# Patient Record
Sex: MALE | Race: WHITE | NOT HISPANIC OR LATINO
[De-identification: names, ages, dates, MRNs, and addresses within clinical notes are randomized per-mention and may not be internally consistent; named-entity substitution may affect disease eponyms.]

---
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
## 2020-07-30 ENCOUNTER — INPATIENT (INPATIENT)
Facility: HOSPITAL | Age: 20
LOS: 2 days | Discharge: HOME | DRG: 872 | End: 2020-08-02
Attending: STUDENT IN AN ORGANIZED HEALTH CARE EDUCATION/TRAINING PROGRAM | Admitting: HOSPITALIST
Payer: COMMERCIAL

## 2020-07-30 VITALS
TEMPERATURE: 103 F | OXYGEN SATURATION: 97 % | DIASTOLIC BLOOD PRESSURE: 55 MMHG | SYSTOLIC BLOOD PRESSURE: 110 MMHG | RESPIRATION RATE: 22 BRPM | HEART RATE: 110 BPM

## 2020-07-30 DIAGNOSIS — K52.9 NONINFECTIVE GASTROENTERITIS AND COLITIS, UNSPECIFIED: ICD-10-CM

## 2020-07-30 DIAGNOSIS — A41.9 SEPSIS, UNSPECIFIED ORGANISM: ICD-10-CM

## 2020-07-30 DIAGNOSIS — Z02.9 ENCOUNTER FOR ADMINISTRATIVE EXAMINATIONS, UNSPECIFIED: ICD-10-CM

## 2020-07-30 LAB
ALBUMIN SERPL ELPH-MCNC: 4.8 G/DL — SIGNIFICANT CHANGE UP (ref 3.3–5)
ALP SERPL-CCNC: 75 U/L — SIGNIFICANT CHANGE UP (ref 40–120)
ALT FLD-CCNC: 16 U/L — SIGNIFICANT CHANGE UP (ref 10–45)
ANION GAP SERPL CALC-SCNC: 17 MMOL/L — SIGNIFICANT CHANGE UP (ref 5–17)
APTT BLD: 29.2 SEC — SIGNIFICANT CHANGE UP (ref 27.5–35.5)
AST SERPL-CCNC: 22 U/L — SIGNIFICANT CHANGE UP (ref 10–40)
BASOPHILS # BLD AUTO: 0 K/UL — SIGNIFICANT CHANGE UP (ref 0–0.2)
BASOPHILS NFR BLD AUTO: 0 % — SIGNIFICANT CHANGE UP (ref 0–2)
BILIRUB SERPL-MCNC: 1.4 MG/DL — HIGH (ref 0.2–1.2)
BUN SERPL-MCNC: 15 MG/DL — SIGNIFICANT CHANGE UP (ref 7–23)
CALCIUM SERPL-MCNC: 9.4 MG/DL — SIGNIFICANT CHANGE UP (ref 8.4–10.5)
CHLORIDE SERPL-SCNC: 96 MMOL/L — SIGNIFICANT CHANGE UP (ref 96–108)
CO2 SERPL-SCNC: 23 MMOL/L — SIGNIFICANT CHANGE UP (ref 22–31)
CREAT SERPL-MCNC: 1.09 MG/DL — SIGNIFICANT CHANGE UP (ref 0.5–1.3)
EOSINOPHIL # BLD AUTO: 0 K/UL — SIGNIFICANT CHANGE UP (ref 0–0.5)
EOSINOPHIL NFR BLD AUTO: 0 % — SIGNIFICANT CHANGE UP (ref 0–6)
GLUCOSE SERPL-MCNC: 125 MG/DL — HIGH (ref 70–99)
HCT VFR BLD CALC: 42.7 % — SIGNIFICANT CHANGE UP (ref 39–50)
HGB BLD-MCNC: 14.4 G/DL — SIGNIFICANT CHANGE UP (ref 13–17)
HIV 1 & 2 AB SERPL IA.RAPID: SIGNIFICANT CHANGE UP
INR BLD: 1.22 RATIO — HIGH (ref 0.88–1.16)
LIDOCAIN IGE QN: 28 U/L — SIGNIFICANT CHANGE UP (ref 7–60)
LYMPHOCYTES # BLD AUTO: 0.32 K/UL — LOW (ref 1–3.3)
LYMPHOCYTES # BLD AUTO: 2.6 % — LOW (ref 13–44)
MANUAL SMEAR VERIFICATION: SIGNIFICANT CHANGE UP
MCHC RBC-ENTMCNC: 29.8 PG — SIGNIFICANT CHANGE UP (ref 27–34)
MCHC RBC-ENTMCNC: 33.7 GM/DL — SIGNIFICANT CHANGE UP (ref 32–36)
MCV RBC AUTO: 88.4 FL — SIGNIFICANT CHANGE UP (ref 80–100)
MONOCYTES # BLD AUTO: 0.64 K/UL — SIGNIFICANT CHANGE UP (ref 0–0.9)
MONOCYTES NFR BLD AUTO: 5.2 % — SIGNIFICANT CHANGE UP (ref 2–14)
NEUTROPHILS # BLD AUTO: 11.08 K/UL — HIGH (ref 1.8–7.4)
NEUTROPHILS NFR BLD AUTO: 80.9 % — HIGH (ref 43–77)
NEUTS BAND # BLD: 9.6 % — HIGH (ref 0–8)
PLAT MORPH BLD: NORMAL — SIGNIFICANT CHANGE UP
PLATELET # BLD AUTO: 204 K/UL — SIGNIFICANT CHANGE UP (ref 150–400)
POTASSIUM SERPL-MCNC: 3.6 MMOL/L — SIGNIFICANT CHANGE UP (ref 3.5–5.3)
POTASSIUM SERPL-SCNC: 3.6 MMOL/L — SIGNIFICANT CHANGE UP (ref 3.5–5.3)
PROT SERPL-MCNC: 7.4 G/DL — SIGNIFICANT CHANGE UP (ref 6–8.3)
PROTHROM AB SERPL-ACNC: 14.4 SEC — HIGH (ref 10.6–13.6)
RBC # BLD: 4.83 M/UL — SIGNIFICANT CHANGE UP (ref 4.2–5.8)
RBC # FLD: 12 % — SIGNIFICANT CHANGE UP (ref 10.3–14.5)
RBC BLD AUTO: SIGNIFICANT CHANGE UP
SARS-COV-2 RNA SPEC QL NAA+PROBE: SIGNIFICANT CHANGE UP
SODIUM SERPL-SCNC: 136 MMOL/L — SIGNIFICANT CHANGE UP (ref 135–145)
VARIANT LYMPHS # BLD: 1.7 % — SIGNIFICANT CHANGE UP (ref 0–6)
WBC # BLD: 12.24 K/UL — HIGH (ref 3.8–10.5)
WBC # FLD AUTO: 12.24 K/UL — HIGH (ref 3.8–10.5)

## 2020-07-30 PROCEDURE — 99285 EMERGENCY DEPT VISIT HI MDM: CPT | Mod: CR

## 2020-07-30 PROCEDURE — 71045 X-RAY EXAM CHEST 1 VIEW: CPT | Mod: 26

## 2020-07-30 PROCEDURE — 74177 CT ABD & PELVIS W/CONTRAST: CPT | Mod: 26

## 2020-07-30 PROCEDURE — 99223 1ST HOSP IP/OBS HIGH 75: CPT

## 2020-07-30 RX ORDER — SODIUM CHLORIDE 9 MG/ML
1000 INJECTION, SOLUTION INTRAVENOUS
Refills: 0 | Status: DISCONTINUED | OUTPATIENT
Start: 2020-07-30 | End: 2020-07-31

## 2020-07-30 RX ORDER — KETOROLAC TROMETHAMINE 30 MG/ML
15 SYRINGE (ML) INJECTION ONCE
Refills: 0 | Status: DISCONTINUED | OUTPATIENT
Start: 2020-07-30 | End: 2020-07-30

## 2020-07-30 RX ORDER — ACETAMINOPHEN 500 MG
1000 TABLET ORAL ONCE
Refills: 0 | Status: COMPLETED | OUTPATIENT
Start: 2020-07-30 | End: 2020-07-30

## 2020-07-30 RX ORDER — METRONIDAZOLE 500 MG
500 TABLET ORAL ONCE
Refills: 0 | Status: COMPLETED | OUTPATIENT
Start: 2020-07-30 | End: 2020-07-30

## 2020-07-30 RX ORDER — CIPROFLOXACIN LACTATE 400MG/40ML
400 VIAL (ML) INTRAVENOUS ONCE
Refills: 0 | Status: COMPLETED | OUTPATIENT
Start: 2020-07-30 | End: 2020-07-30

## 2020-07-30 RX ORDER — METRONIDAZOLE 500 MG
500 TABLET ORAL EVERY 8 HOURS
Refills: 0 | Status: DISCONTINUED | OUTPATIENT
Start: 2020-07-30 | End: 2020-08-02

## 2020-07-30 RX ORDER — SODIUM CHLORIDE 9 MG/ML
1000 INJECTION INTRAMUSCULAR; INTRAVENOUS; SUBCUTANEOUS ONCE
Refills: 0 | Status: COMPLETED | OUTPATIENT
Start: 2020-07-30 | End: 2020-07-30

## 2020-07-30 RX ORDER — CIPROFLOXACIN LACTATE 400MG/40ML
400 VIAL (ML) INTRAVENOUS EVERY 12 HOURS
Refills: 0 | Status: DISCONTINUED | OUTPATIENT
Start: 2020-07-30 | End: 2020-08-02

## 2020-07-30 RX ADMIN — Medication 400 MILLIGRAM(S): at 23:25

## 2020-07-30 RX ADMIN — Medication 100 MILLIGRAM(S): at 20:12

## 2020-07-30 RX ADMIN — Medication 15 MILLIGRAM(S): at 16:59

## 2020-07-30 RX ADMIN — Medication 400 MILLIGRAM(S): at 20:12

## 2020-07-30 RX ADMIN — Medication 200 MILLIGRAM(S): at 18:47

## 2020-07-30 RX ADMIN — SODIUM CHLORIDE 1000 MILLILITER(S): 9 INJECTION INTRAMUSCULAR; INTRAVENOUS; SUBCUTANEOUS at 16:59

## 2020-07-30 RX ADMIN — Medication 15 MILLIGRAM(S): at 17:29

## 2020-07-30 NOTE — H&P ADULT - HISTORY OF PRESENT ILLNESS
19M c no signif PMHx, pw fever, chills, abd pain, diarrhea for 1 day.    Pt goes to the [employer], and [place], in [facility] from Feb '20 to Apr '20, and at home with his mother from Apr '20 to Jul '20. [place]. 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 He has never had any symptoms like this before. Pt states he woke at 4:30AM this morning when fever, chills, which progressed to abd pain and 2 episodes of loose nonbloody diarrhea. Pt also reports some associated nausea, lightheaded, and SOB with his chills. Pt denies cough, CP, sore throat, HA, vomiting.    VS: Tm 103.2, P 110, /55, R 25, 96% RA  In the ED, received cipro, flagyl, toradol, NS 1L

## 2020-07-30 NOTE — ED PROVIDER NOTE - ATTENDING CONTRIBUTION TO CARE
RGUJRAL 18yo male presents from [facility] presents with fever x 1 day. States he returned from [place] 2 days ago. Today woke up with fever tmax 103, diffuse abdominal cramps and diarrhea x2. Diarrhea is watery no blood. No n/v. Denies any cough, rhinorrhea. + SOB with fever.   Denies any known COVID exposure.  On exam, Patient is awake, alert and oriented x 3.  Patient is well appearing and in no acute distress.  NCAT, PERRL, EOMI. No conjunctivitis.  Neck is supple, No LAD.  Lungs are CTA B/L,+S1S2 no murmurs,  Abdomen:Soft nd/ + diffuse abdominal tenderness +bs no rebound or guarding.  Extremity no edema or calf tender.  Skin with no rash.  Neuro CN3-12 intact. Strength 5/5 in upper and lower extremities. Nml Sensation.Gait normal.   Check Labs, CT to eval for colitis. IVF and anti pyretics and re eval.

## 2020-07-30 NOTE — H&P ADULT - NSHPPHYSICALEXAM_GEN_ALL_CORE
PHYSICAL EXAM:   GENERAL: Alert. Not confused. No acute distress. +thin. Not cachectic. Not obese.  HEAD:  Atraumatic. Normocephalic.  EYES: EOMI. PERRLA. Normal conjunctiva/sclera.  ENT: Neck supple. No JVD. Moist oral mucosa. Not edentulous. No thrush.  LYMPH: Normal supraclavicular/cervical lymph nodes.   CARDIAC: +tachy, Not joselito. Regular rhythm. Not irregularly irregular. S1. S2. No murmur. No rub. No distant heart sounds.  LUNG/CHEST: CTAB. BS equal bilaterally. No wheezes. No rales. No rhonchi.  ABDOMEN: Soft. Mild periumbilical tenderness. No distension. No fluid wave. Normal bowel sounds.  BACK: No midline/vertebral tenderness. No flank tenderness.  VASCULAR: +2 b/l radial or ulnar pulses. Palpable DP pulses.  EXTREMITIES:  No clubbing. No cyanosis. No edema. Moving all 4.  NEUROLOGY: A&Ox3. Non-focal exam. Cranial nerves intact. Normal speech. Sensation intact.  PSYCH: Normal behavior. Normal affect.  SKIN: No jaundice. No erythema. No rash/lesion.  Vascular Access:     ICU Vital Signs Last 24 Hrs  T(C): 37.6 (30 Jul 2020 19:23), Max: 39.6 (30 Jul 2020 16:00)  T(F): 99.6 (30 Jul 2020 19:23), Max: 103.2 (30 Jul 2020 16:00)  HR: 97 (30 Jul 2020 19:23) (88 - 110)  BP: 122/90 (30 Jul 2020 19:23) (110/55 - 130/73)  BP(mean): --  ABP: --  ABP(mean): --  RR: 22 (30 Jul 2020 19:23) (14 - 25)  SpO2: 100% (30 Jul 2020 19:23) (96% - 100%)      I&O's Summary

## 2020-07-30 NOTE — H&P ADULT - PROBLEM SELECTOR PLAN 2
- 2/2 colitis  - low suspicion for covid 2/2 no cough, absence of lung opacities on CT a/p, although may be ?early covid  - IVF  - f/u blood cx

## 2020-07-30 NOTE — ED PROVIDER NOTE - OBJECTIVE STATEMENT
20 yo male from the [facility] sent for symptoms of covid. PT states "I came from [place]  on the 26th and was covid tested and was negative.  I have felt fevers, chills, general malaise for the past couple of days. 18 yo male from the [facility] sent for symptoms of covid. PT states "I came from [place]  on the 26th and was covid tested and was negative.  I have felt fevers, chills, general malaise for the past couple of days. I also have stomach pains and had two episodes of diarrhea.  I want to get tested again for COVID and possibly  be admitted.

## 2020-07-30 NOTE — ED PROVIDER NOTE - PROGRESS NOTE DETAILS
SPoke with DR. Latif from [facility].  Pt being admitted, blood cultures complete Spoke to hospitalist, patient w recent travel and is high risk for COVID. RGUJRAL

## 2020-07-30 NOTE — H&P ADULT - PROBLEM SELECTOR PLAN 1
- cont cipro flagyl  - pt should f/u with GI as outpt for resolution of mild lymphadenopathy  - low residue diet  - check stool culture, o&p

## 2020-07-30 NOTE — ED CLERICAL - NS ED CLERK NOTE PRE-ARRIVAL INFORMATION; ADDITIONAL PRE-ARRIVAL INFORMATION
CC/Reason For referral: Recently from [facility]. covid neg 7/26.  Now c/o abd pain, diarrhea, fever 102. Medicated with tylenol 1 gm at 1515  Preferred Consultant(if applicable): sawyer  Who admits for you (if needed): na  Do you have documents you would like to fax over? no  Would you still like to speak to an ED attending? please call with disposition

## 2020-07-30 NOTE — ED ADULT NURSE REASSESSMENT NOTE - NS ED NURSE REASSESS COMMENT FT1
Report received from EMANUEL Hernandez. Pt resting comfortably in bed at this time. VS as documented. Provided with blankets for comfort. Bed locked and lowered. Comfort and safety measures maintained.

## 2020-07-30 NOTE — ED ADULT NURSE NOTE - NSIMPLEMENTINTERV_GEN_ALL_ED
Implemented All Universal Safety Interventions:  [place] to call system. Call bell, personal items and telephone within reach. Instruct patient to call for assistance. Room bathroom lighting operational. Non-slip footwear when patient is off stretcher. Physically safe environment: no spills, clutter or unnecessary equipment. Stretcher in lowest position, wheels locked, appropriate side rails in place.

## 2020-07-30 NOTE — ED ADULT NURSE NOTE - OBJECTIVE STATEMENT
19 y M presents to the ED with dizziness, lightheadedness, headaches, diarrhea, abdominal pain, light sensitivity, fevers and chills. Pt reports that he was in [place] and has recently tested negative. Reports had a gram of Tylenol 2 hours ago. On assessment, A&Ox3. Denies numbness and tingling. Breathing spontaneously and unlabored. Sating 96% on Room air. Denies cough, SOB and CP. S1 and S2 heard. No Peripheral edema. Cap refill 2s. No JVD. Peripheral pulses strong and equal bilaterally. On cardiac monitor, Sinus tachycardia. Denies CP, SOB and palpitations. Abdomen soft, nondistended, negative CVA tenderness, positive bowel sounds in all four quadrants. Pt is continent. Denies dysuria, melena and hematuria. IV placed 18g in RAC. Labs drawn and sent. Pt safety maintained. Call bell within reach. Side rails in upward position. Pt awaiting dispo.

## 2020-07-30 NOTE — H&P ADULT - NSHPLABSRESULTS_GEN_ALL_CORE
Personally reviewed labs.   Personally reviewed imaging.                         14.4   12.24 )-----------( 204      ( 30 Jul 2020 17:21 )             42.7       07-30    136  |  96  |  15  ----------------------------<  125<H>  3.6   |  23  |  1.09    Ca    9.4      30 Jul 2020 17:21    TPro  7.4  /  Alb  4.8  /  TBili  1.4<H>  /  DBili  x   /  AST  22  /  ALT  16  /  AlkPhos  75  07-30            LIVER FUNCTIONS - ( 30 Jul 2020 17:21 )  Alb: 4.8 g/dL / Pro: 7.4 g/dL / ALK PHOS: 75 U/L / ALT: 16 U/L / AST: 22 U/L / GGT: x             PT/INR - ( 30 Jul 2020 17:21 )   PT: 14.4 sec;   INR: 1.22 ratio         PTT - ( 30 Jul 2020 17:21 )  PTT:29.2 sec

## 2020-07-30 NOTE — H&P ADULT - NSHPREVIEWOFSYSTEMS_GEN_ALL_CORE
REVIEW OF SYSTEMS:  CONSTITUTIONAL: No weakness. +fevers. +chills. +rigors. No weight loss. No night sweats. +poor appetite.  EYES: No blurry or double vision. No eye pain.  ENT: No hearing difficulty. No vertigo. No dysphagia. No sore throat. No Sinusitis/rhinorrhea.   NECK: No pain. No stiffness/rigidity.  CARDIAC: No chest pain. No palpitations. +lightheadedness. No syncope.  RESPIRATORY: No cough. +SOB. No hemoptysis.  GASTROINTESTINAL: +abdominal pain. +nausea. No vomiting. No hematemesis. +diarrhea. No constipation. No melena. No hematochezia.  GENITOURINARY: No dysuria. No frequency. No hesitancy. No hematuria. No oliguria.  NEUROLOGICAL: No numbness/tingling. No focal weakness. No urinary or fecal incontinence. No headache. No unsteady gait.  BACK: No back pain. No flank pain.  EXTREMITIES: No lower extremity edema. Full ROM. No joint pain.  SKIN: No rashes. No itching. No other lesions.  PSYCHIATRIC: No depression. No anxiety. No SI/HI.  ALLERGIC: No lip swelling. No hives.  All other review of systems is negative unless indicated above.  Unless indicated above, unable to assess ROS 2/2

## 2020-07-31 LAB
ALBUMIN SERPL ELPH-MCNC: 4.1 G/DL — SIGNIFICANT CHANGE UP (ref 3.3–5)
ALP SERPL-CCNC: 55 U/L — SIGNIFICANT CHANGE UP (ref 40–120)
ALT FLD-CCNC: 13 U/L — SIGNIFICANT CHANGE UP (ref 10–45)
ANION GAP SERPL CALC-SCNC: 18 MMOL/L — HIGH (ref 5–17)
AST SERPL-CCNC: 21 U/L — SIGNIFICANT CHANGE UP (ref 10–40)
BILIRUB DIRECT SERPL-MCNC: 0.3 MG/DL — HIGH (ref 0–0.2)
BILIRUB INDIRECT FLD-MCNC: 1.7 MG/DL — HIGH (ref 0.2–1)
BILIRUB SERPL-MCNC: 2 MG/DL — HIGH (ref 0.2–1.2)
BUN SERPL-MCNC: 14 MG/DL — SIGNIFICANT CHANGE UP (ref 7–23)
CALCIUM SERPL-MCNC: 8.4 MG/DL — SIGNIFICANT CHANGE UP (ref 8.4–10.5)
CHLORIDE SERPL-SCNC: 100 MMOL/L — SIGNIFICANT CHANGE UP (ref 96–108)
CO2 SERPL-SCNC: 17 MMOL/L — LOW (ref 22–31)
CREAT SERPL-MCNC: 1.14 MG/DL — SIGNIFICANT CHANGE UP (ref 0.5–1.3)
CRP SERPL-MCNC: 8.92 MG/DL — HIGH (ref 0–0.4)
GAS PNL BLDV: SIGNIFICANT CHANGE UP
GLUCOSE SERPL-MCNC: 129 MG/DL — HIGH (ref 70–99)
HCT VFR BLD CALC: 41.6 % — SIGNIFICANT CHANGE UP (ref 39–50)
HGB BLD-MCNC: 13.8 G/DL — SIGNIFICANT CHANGE UP (ref 13–17)
INR BLD: 1.36 RATIO — HIGH (ref 0.88–1.16)
MAGNESIUM SERPL-MCNC: 1.3 MG/DL — LOW (ref 1.6–2.6)
MCHC RBC-ENTMCNC: 29.9 PG — SIGNIFICANT CHANGE UP (ref 27–34)
MCHC RBC-ENTMCNC: 33.2 GM/DL — SIGNIFICANT CHANGE UP (ref 32–36)
MCV RBC AUTO: 90.2 FL — SIGNIFICANT CHANGE UP (ref 80–100)
NRBC # BLD: 0 /100 WBCS — SIGNIFICANT CHANGE UP (ref 0–0)
PHOSPHATE SERPL-MCNC: 2.2 MG/DL — LOW (ref 2.5–4.5)
PLATELET # BLD AUTO: 147 K/UL — LOW (ref 150–400)
POTASSIUM SERPL-MCNC: 3.7 MMOL/L — SIGNIFICANT CHANGE UP (ref 3.5–5.3)
POTASSIUM SERPL-SCNC: 3.7 MMOL/L — SIGNIFICANT CHANGE UP (ref 3.5–5.3)
PROT SERPL-MCNC: 6.3 G/DL — SIGNIFICANT CHANGE UP (ref 6–8.3)
PROTHROM AB SERPL-ACNC: 15.8 SEC — HIGH (ref 10.6–13.6)
RBC # BLD: 4.61 M/UL — SIGNIFICANT CHANGE UP (ref 4.2–5.8)
RBC # FLD: 12.2 % — SIGNIFICANT CHANGE UP (ref 10.3–14.5)
SARS-COV-2 IGG SERPL QL IA: NEGATIVE — SIGNIFICANT CHANGE UP
SARS-COV-2 IGM SERPL IA-ACNC: <0.1 INDEX — SIGNIFICANT CHANGE UP
SODIUM SERPL-SCNC: 135 MMOL/L — SIGNIFICANT CHANGE UP (ref 135–145)
WBC # BLD: 12.43 K/UL — HIGH (ref 3.8–10.5)
WBC # FLD AUTO: 12.43 K/UL — HIGH (ref 3.8–10.5)

## 2020-07-31 PROCEDURE — 99233 SBSQ HOSP IP/OBS HIGH 50: CPT | Mod: GC

## 2020-07-31 PROCEDURE — 99233 SBSQ HOSP IP/OBS HIGH 50: CPT

## 2020-07-31 PROCEDURE — 99232 SBSQ HOSP IP/OBS MODERATE 35: CPT

## 2020-07-31 RX ORDER — MAGNESIUM SULFATE 500 MG/ML
2 VIAL (ML) INJECTION ONCE
Refills: 0 | Status: COMPLETED | OUTPATIENT
Start: 2020-07-31 | End: 2020-07-31

## 2020-07-31 RX ORDER — SODIUM CHLORIDE 9 MG/ML
1000 INJECTION, SOLUTION INTRAVENOUS
Refills: 0 | Status: DISCONTINUED | OUTPATIENT
Start: 2020-07-31 | End: 2020-08-02

## 2020-07-31 RX ORDER — POTASSIUM PHOSPHATE, MONOBASIC POTASSIUM PHOSPHATE, DIBASIC 236; 224 MG/ML; MG/ML
15 INJECTION, SOLUTION INTRAVENOUS ONCE
Refills: 0 | Status: COMPLETED | OUTPATIENT
Start: 2020-07-31 | End: 2020-07-31

## 2020-07-31 RX ORDER — ACETAMINOPHEN 500 MG
1000 TABLET ORAL ONCE
Refills: 0 | Status: COMPLETED | OUTPATIENT
Start: 2020-07-31 | End: 2020-07-31

## 2020-07-31 RX ORDER — ACETAMINOPHEN 500 MG
650 TABLET ORAL ONCE
Refills: 0 | Status: COMPLETED | OUTPATIENT
Start: 2020-07-31 | End: 2020-07-31

## 2020-07-31 RX ADMIN — Medication 50 GRAM(S): at 10:28

## 2020-07-31 RX ADMIN — Medication 400 MILLIGRAM(S): at 05:56

## 2020-07-31 RX ADMIN — Medication 1000 MILLIGRAM(S): at 00:26

## 2020-07-31 RX ADMIN — Medication 100 MILLIGRAM(S): at 23:02

## 2020-07-31 RX ADMIN — Medication 100 MILLIGRAM(S): at 06:57

## 2020-07-31 RX ADMIN — Medication 650 MILLIGRAM(S): at 14:59

## 2020-07-31 RX ADMIN — Medication 1000 MILLIGRAM(S): at 06:57

## 2020-07-31 RX ADMIN — Medication 100 MILLIGRAM(S): at 01:14

## 2020-07-31 RX ADMIN — Medication 650 MILLIGRAM(S): at 15:23

## 2020-07-31 RX ADMIN — POTASSIUM PHOSPHATE, MONOBASIC POTASSIUM PHOSPHATE, DIBASIC 62.5 MILLIMOLE(S): 236; 224 INJECTION, SOLUTION INTRAVENOUS at 13:58

## 2020-07-31 RX ADMIN — Medication 200 MILLIGRAM(S): at 05:42

## 2020-07-31 RX ADMIN — Medication 100 MILLIGRAM(S): at 18:05

## 2020-07-31 RX ADMIN — Medication 200 MILLIGRAM(S): at 16:50

## 2020-07-31 NOTE — CONSULT NOTE ADULT - ASSESSMENT
19M without PMHx, presenting fever, chills, abd pain, diarrhea for 1 day Found to have sepsis with mild colitis of large bowel. HIV negative.     Overall, Sepsis, colitis, leukocytosis with bandemia, mild hyperbilirubinemia.     Sepsis with colitis - Likely acute gastroenteritis due to recent food. Presentation favors infectious agents of acute nonbloody diarrhea, likely viral.  Still would consider Covid infection due to high fevers, lymphopenia, and colitis. Less possible consideration would be inflammatory bowel disease. HIV negative.   - Send GI PCR  - cont flagyll 500mg q8h and cipro 400mg IV q12h  - f/u blood and stool cultures  - monitor stool counts  - send covid inflammatory markers - LDH, ferritin, ESR    Caleb Carrera MD  Fellow, Infectious Diseases, PGY-4  Pager: [phone]  After 5pm/Weekends: Call [phone] 19M without PMHx, presenting fever, chills, abd pain, diarrhea for 1 day Found to have sepsis with mild colitis of large bowel. HIV negative.     Overall, Sepsis, colitis, leukocytosis with bandemia, mild hyperbilirubinemia.     Sepsis with colitis - Likely acute gastroenteritis due to recent food. Presentation favors infectious agents of acute nonbloody diarrhea, likely viral.  Still would consider Covid infection due to high fevers, lymphopenia, and colitis. Less possible consideration would be inflammatory bowel disease. HIV negative.   - Send GI PCR  - cont flagyll 500mg q8h and cipro 400mg IV q12h  - f/u blood and stool cultures  - monitor stool counts  - send covid inflammatory markers - LDH, ferritin, ESR  - repeat covid PCR    Caleb Carrera MD  Fellow, Infectious Diseases, PGY-4  Pager: [phone]  After 5pm/Weekends: Call [phone] 19M without PMHx, presenting fever, chills, abd pain, diarrhea for 1 day Found to have sepsis with mild colitis of large bowel. HIV negative.     Overall, Sepsis, fever, tachy, leucocytosis, colitis, leukocytosis with bandemia, mild hyperbilirubinemia.     Sepsis with colitis - Likely acute gastroenteritis due to recent food. Presentation favors infectious agents of acute nonbloody diarrhea.  Still would consider Covid infection due to high fevers, lymphopenia, and colitis. Less possible consideration would be inflammatory bowel disease. HIV negative.   - Send GI PCR  - cont flagyl 500mg q8h and cipro 400mg IV q12h  - f/u blood and stool cultures  - monitor stool counts  - send covid inflammatory markers - LDH, ferritin, ESR  - repeat covid PCR    Caleb Carrera MD  Fellow, Infectious Diseases, PGY-4  Pager: [phone]  After 5pm/Weekends: Call [phone]

## 2020-07-31 NOTE — CHART NOTE - NSCHARTNOTEFT_GEN_A_CORE
MEDICINE NP    Notified by RN patient with [id number] . Seen and examined patient at bedside. Patient is alert, NAD. Denies HA, CP, SOB, cough, N/V. Pt  states  he  has  intermittently  abdominal  pain .  Pain is  currently  3/10.   Pt  was  swabbed  in  the  ED  for  Covid which  was  negative.  Pt  admitted  sx  is  colitis     VITAL SIGNS:  T(C): 39.6 (07-30-20 @ 22:19), Max: 39.6 (07-30-20 @ 16:00)  HR: 99 (07-30-20 @ 22:19) (88 - 110)  BP: 103/56 (07-30-20 @ 22:19) (103/56 - 130/73)  RR: 18 (07-30-20 @ 22:19) (14 - 25)  SpO2: 99% (07-30-20 @ 22:19) (96% - 100%)  Wt(kg): --      LABORATORY:                          14.4   12.24 )-----------( 204      ( 30 Jul 2020 17:21 )             42.7       07-30    136  |  96  |  15  ----------------------------<  125<H>  3.6   |  23  |  1.09    Ca    9.4      30 Jul 2020 17:21    TPro  7.4  /  Alb  4.8  /  TBili  1.4<H>  /  DBili  x   /  AST  22  /  ALT  16  /  AlkPhos  75  07-30          MICROBIOLOGY:     MEDICATIONS  (STANDING):  acetaminophen  IVPB .. 1000 milliGRAM(s) IV Intermittent once  ciprofloxacin   IVPB 400 milliGRAM(s) IV Intermittent every 12 hours  lactated ringers. 1000 milliLiter(s) (200 mL/Hr) IV Continuous <Continuous>  metroNIDAZOLE  IVPB 500 milliGRAM(s) IV Intermittent every 8 hours        RADIOLOGY:          PHYSICAL EXAM:    Constitutional: AOx3. NAD.    Respiratory: clear lungs bilaterally. No wheezing, rhonchi, or crackles.    Cardiovascular: S1 S2. No murmurs.    Gastrointestinal: BS X4 active. soft. nontender.    Extremities/Vascular: +2 pulses bilaterally. No BLE edema.      ASSESSMENT/PLAN:   HPI:  19M c no signif PMHx, pw fever, chills, abd pain, diarrhea for 1 day.    Pt goes to the [email or web address], and [place], in [facility] from Feb '20 to Apr '20, and at home with his mother from Apr '20 to Jul '20. Pt states he drove up to the [employer] [place] [employer] on 7/26/20. 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 He has never had any symptoms like this before. Pt states he woke at 4:30AM this morning when fever, chills, which progressed to abd pain and 2 episodes of loose nonbloody diarrhea. Pt also reports some associated nausea, lightheaded, and SOB with his chills. Pt denies cough, CP, sore throat, HA, vomiting.  Now  with  fever  of  103.2    VS: Tm 103.2, P 110, /55, R 25, 96% RA  In the ED, received cipro, flagyl, toradol, NS 1L (30 Jul 2020 21:19)      Fever    most  likely  secondary  to  colitis   -tylenol and cooling measures prn for pyrexia  -  F/U Pancultures  (  )  -c/w Cipro  and   Flagyl  IV    Discussed  pt  status  with  DR Walters  (  hospitalist  )

## 2020-07-31 NOTE — CONSULT NOTE ADULT - SUBJECTIVE AND OBJECTIVE BOX
Patient is a 19y old  Male who presents with a chief complaint of fever, abd pain, diarrhea (31 Jul 2020 12:57)    HPI:  19M without PMHx, pw fever, chills, abd pain, diarrhea for 1 day.    Pt goes to the [employer], and was in the [facility] from Nov '19 to Feb '20, in [facility] from Feb '20 to Apr '20, and at home with his mother from Apr '20 to Jul '20 in [place]. Pt states he drove up to the [place] on friday to Sun 7/26/20. He was cleared to resume activities after a testing negative for covid. Pt states he woke at 4:30AM this morning when fever, chills, which progressed to abd pain and 2 episodes of loose nonbloody diarrhea. Pt also reports some associated nausea, lightheaded, and SOB with his chills. Pt denies cough, CP, sore throat, HA, vomiting. 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 He has never had any symptoms like this before. He is not allowed to go out and only ate from inside. Reports eating undercooked empanadas on wednesday. Prior to academy, he had cold cut sandwich from a rest stop in [place] on saturday. No fam hx.     CT with mild colitis. He has received cipro and flagyll since admission.     prior hospital charts reviewed [x  ]  primary team notes reviewed [x  ]  other consultant notes reviewed [x  ]  PAST MEDICAL & SURGICAL HISTORY:  No pertinent past medical history  R shoulder surgery    Allergies  No Known Allergies    ANTIMICROBIALS (past 90 days)  MEDICATIONS  (STANDING):  ciprofloxacin   IVPB   200 mL/Hr IV Intermittent (07-31-20 @ 16:50)   200 mL/Hr IV Intermittent (07-31-20 @ 05:42)    ciprofloxacin   IVPB   200 mL/Hr IV Intermittent (07-30-20 @ 18:47)    metroNIDAZOLE  IVPB   100 mL/Hr IV Intermittent (07-31-20 @ 06:57)   100 mL/Hr IV Intermittent (07-31-20 @ 01:14)    metroNIDAZOLE  IVPB   100 mL/Hr IV Intermittent (07-30-20 @ 20:12)      ANTIMICROBIALS:    ciprofloxacin   IVPB 400 every 12 hours  metroNIDAZOLE  IVPB 500 every 8 hours    OTHER MEDS: MEDICATIONS  (STANDING):    SOCIAL HISTORY:   Social History:  Social History:  Single, sexual active with woman, uses condoms, no hx of STIs. No tobacco, drug, ETOH use.     FAMILY HISTORY:  No pertinent family history in first degree relatives    REVIEW OF SYSTEMS  [  ] ROS unobtainable because:    [ x ] All other systems negative except as noted below:	    Constitutional:  [ x] fever [ ] chills  [ ] weight loss  [ ] weakness  Skin:  [ ] rash [ ] phlebitis	  Eyes: [ ] icterus [ ] pain  [ ] discharge	  ENMT: [ ] sore throat  [ ] thrush [ ] ulcers [ ] exudates  Respiratory: [ ] dyspnea [ ] hemoptysis [ ] cough [ ] sputum	  Cardiovascular:  [ ] chest pain [ ] palpitations [ ] edema	  Gastrointestinal:  [ ] nausea [ x] vomiting [x ] diarrhea [ ] constipation [ ] pain	  Genitourinary:  [ ] dysuria [ ] frequency [ ] hematuria [ ] discharge [ ] flank pain  [ ] incontinence  Musculoskeletal:  [ ] myalgias [ ] arthralgias [ ] arthritis  [ ] back pain  Neurological:  [ ] headache [ ] seizures  [ ] confusion/altered mental status  Psychiatric:  [ ] anxiety [ ] depression	  Hematology/Lymphatics:  [ ] lymphadenopathy  Endocrine:  [ ] adrenal [ ] thyroid  Allergic/Immunologic:	 [ ] transplant [ ] seasonal    Vital Signs Last 24 Hrs  T(F): 98.6 (07-31-20 @ 11:17), Max: 103.2 (07-30-20 @ 16:00)  Vital Signs Last 24 Hrs  HR: 81 (07-31-20 @ 11:17) (81 - 104)  BP: 130/76 (07-31-20 @ 11:17) (103/56 - 130/76)  RR: 17 (07-31-20 @ 11:17)  SpO2: 100% (07-31-20 @ 11:17) (95% - 100%)  Wt(kg): --    EXAM:  Constitutional: Not in acute distress  Eyes: pupils bilaterally reactive to light. No icterus.  Oral cavity: Clear, no lesions  Neck: No neck vein distension noted  RS: Chest clear to auscultation bilaterally. No wheeze/rhonchi/crepitations.  CVS: S1, S2 heard. Regular rate and rhythm. No murmurs/rubs/gallops.  Abdomen: Soft. tenderness in lower quadrants. No guarding/rigidity  : No acute abnormalities  Extremities: Warm. No pedal edema  Skin: No lesions noted  Vascular: No evidence of phlebitis  Neuro: Alert, oriented to time/place/person                          13.8   12.43 )-----------( 147      ( 31 Jul 2020 05:57 )             41.6     07-31    135  |  100  |  14  ----------------------------<  129<H>  3.7   |  17<L>  |  1.14    Ca    8.4      31 Jul 2020 05:57  Phos  2.2     07-31  Mg     1.3     07-31    TPro  6.3  /  Alb  4.1  /  TBili  2.0<H>  /  DBili  0.3<H>  /  AST  21  /  ALT  13  /  AlkPhos  55  07-31    MICROBIOLOGY:  Blood cultures and stool cultures pending    COVID-19 PCR: NotDetec (30 Jul 2020 17:21)    RADIOLOGY:  imaging below personally reviewed      OTHER TESTS:< from: CT Abdomen and Pelvis w/ IV Cont (07.30.20 @ 18:13) >  IMPRESSION:  Moderate amount of fluid and stool throughout colon. Mild cecal, ascending and proximal transverse colonic wall thickening possibly representing some form of colitis. Prominent fluid-filled terminal ileum without wall thickening. No proximal obstruction. Numerous right lower quadrant mildly prominent lymph nodes. The aforementioned findings may represent either an infectious or inflammatory etiology. Correlate clinically.      < from: Xray Chest 1 View AP/PA (07.30.20 @ 17:37) >  IMPRESSION:  Clear lungs. Patient is a 19y old  Male who presents with a chief complaint of fever, abd pain, diarrhea (31 Jul 2020 12:57)    HPI:  19M without PMHx, pw fever, chills, abd pain, diarrhea for 1 day.    Pt goes to the [employer], and was in the [facility] from Nov '19 to Feb '20, in [facility] from Feb '20 to Apr '20, and at home with his mother from Apr '20 to Jul '20 in [place]. Pt states he drove up to the [place] on friday to Sun 7/26/20. He was cleared to resume activities after a testing negative for covid. Pt states he woke at 4:30AM this morning when fever, chills, which progressed to abd pain and 2 episodes of loose nonbloody diarrhea. Pt also reports some associated nausea, lightheaded, and SOB with his chills. Pt denies cough, CP, sore throat, HA, vomiting. 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 He has never had any symptoms like this before. He is not allowed to go out and only ate from inside. Reports eating undercooked empanadas on wednesday. Prior to academy, he had cold cut sandwich from a rest stop in [place] on saturday. No fam hx.     CT with mild colitis. He has received cipro and flagyll since admission.     prior hospital charts reviewed [x  ]  primary team notes reviewed [x  ]  other consultant notes reviewed [x  ]      PAST MEDICAL & SURGICAL HISTORY:  No pertinent past medical history  R shoulder surgery      Allergies  No Known Allergies      ANTIMICROBIALS (past 90 days)  MEDICATIONS  (STANDING):  ciprofloxacin   IVPB   200 mL/Hr IV Intermittent (07-31-20 @ 16:50)   200 mL/Hr IV Intermittent (07-31-20 @ 05:42)    ciprofloxacin   IVPB   200 mL/Hr IV Intermittent (07-30-20 @ 18:47)    metroNIDAZOLE  IVPB   100 mL/Hr IV Intermittent (07-31-20 @ 06:57)   100 mL/Hr IV Intermittent (07-31-20 @ 01:14)    metroNIDAZOLE  IVPB   100 mL/Hr IV Intermittent (07-30-20 @ 20:12)      ANTIMICROBIALS:    ciprofloxacin   IVPB 400 every 12 hours  metroNIDAZOLE  IVPB 500 every 8 hours      OTHER MEDS: MEDICATIONS  (STANDING):      SOCIAL HISTORY:   Social History:  Social History:  Single, sexual active with woman, uses condoms, no hx of STIs. No tobacco, drug, ETOH use.       FAMILY HISTORY:  No pertinent family history  of colitis in first degree relatives    REVIEW OF SYSTEMS  [  ] ROS unobtainable because:    [ x ] All other systems negative except as noted below:	    Constitutional:  [ x] fever [ ] chills  [ ] weight loss  [ ] weakness  Skin:  [ ] rash [ ] phlebitis	  Eyes: [ ] icterus [ ] pain  [ ] discharge	  ENMT: [ ] sore throat  [ ] thrush [ ] ulcers [ ] exudates  Respiratory: [ ] dyspnea [ ] hemoptysis [ ] cough [ ] sputum	  Cardiovascular:  [ ] chest pain [ ] palpitations [ ] edema	  Gastrointestinal:  [ ] nausea [ x] vomiting [x ] diarrhea [ ] constipation [ x] pain	  Genitourinary:  [ ] dysuria [ ] frequency [ ] hematuria [ ] discharge [ ] flank pain  [ ] incontinence  Musculoskeletal:  [ ] myalgias [ ] arthralgias [ ] arthritis  [ ] back pain  Neurological:  [ x] headache [ ] seizures  [ ] confusion/altered mental status  Psychiatric:  [ ] anxiety [ ] depression	  Endocrine:  [ ] adrenal [ ] thyroid  Allergic/Immunologic:	 [ ] transplant [ ] seasonal      Vital Signs Last 24 Hrs  T(F): 98.6 (07-31-20 @ 11:17), Max: 103.2 (07-30-20 @ 16:00)  Vital Signs Last 24 Hrs  HR: 81 (07-31-20 @ 11:17) (81 - 104)  BP: 130/76 (07-31-20 @ 11:17) (103/56 - 130/76)  RR: 17 (07-31-20 @ 11:17)  SpO2: 100% (07-31-20 @ 11:17) (95% - 100%)  Wt(kg): --      EXAM:  Constitutional: Not in acute distress  Eyes: pupils bilaterally reactive to light. No icterus.  Oral cavity: Clear, no lesions  Neck: No neck vein distension noted  RS: Chest clear to auscultation bilaterally.   CVS: S1, S2 heard. Regular rate and rhythm.  Abdomen: Soft. tenderness in lower quadrants.   : No acute abnormalities  Extremities: Warm. No pedal edema  Skin: No lesions noted  Vascular: No evidence of phlebitis  Neuro: Alert, oriented to time/place/person                          13.8   12.43 )-----------( 147      ( 31 Jul 2020 05:57 )             41.6     07-31    135  |  100  |  14  ----------------------------<  129<H>  3.7   |  17<L>  |  1.14    Ca    8.4      31 Jul 2020 05:57  Phos  2.2     07-31  Mg     1.3     07-31    TPro  6.3  /  Alb  4.1  /  TBili  2.0<H>  /  DBili  0.3<H>  /  AST  21  /  ALT  13  /  AlkPhos  55  07-31      MICROBIOLOGY:  Blood cultures and stool cultures pending      COVID-19 PCR: NotDetec (30 Jul 2020 17:21)      RADIOLOGY:  imaging below personally reviewed    < from: CT Abdomen and Pelvis w/ IV Cont (07.30.20 @ 18:13) >  IMPRESSION:  Moderate amount of fluid and stool throughout colon. Mild cecal, ascending and proximal transverse colonic wall thickening possibly representing some form of colitis. Prominent fluid-filled terminal ileum without wall thickening. No proximal obstruction. Numerous right lower quadrant mildly prominent lymph nodes. The aforementioned findings may represent either an infectious or inflammatory etiology. Correlate clinically.      < from: Xray Chest 1 View AP/PA (07.30.20 @ 17:37) >  IMPRESSION:  Clear lungs.

## 2020-07-31 NOTE — CONSULT NOTE ADULT - ATTENDING COMMENTS
Alan Fields  Pager: [phone]. If no response or past 5 pm call [phone].     Please call ID service for questions over weekend at [phone].

## 2020-08-01 LAB
ALBUMIN SERPL ELPH-MCNC: 3.7 G/DL — SIGNIFICANT CHANGE UP (ref 3.3–5)
ALP SERPL-CCNC: 50 U/L — SIGNIFICANT CHANGE UP (ref 40–120)
ALT FLD-CCNC: 14 U/L — SIGNIFICANT CHANGE UP (ref 10–45)
ANION GAP SERPL CALC-SCNC: 13 MMOL/L — SIGNIFICANT CHANGE UP (ref 5–17)
AST SERPL-CCNC: 23 U/L — SIGNIFICANT CHANGE UP (ref 10–40)
BILIRUB SERPL-MCNC: 0.8 MG/DL — SIGNIFICANT CHANGE UP (ref 0.2–1.2)
BUN SERPL-MCNC: 11 MG/DL — SIGNIFICANT CHANGE UP (ref 7–23)
C DIFF GDH STL QL: NEGATIVE — SIGNIFICANT CHANGE UP
C DIFF GDH STL QL: SIGNIFICANT CHANGE UP
CALCIUM SERPL-MCNC: 8.9 MG/DL — SIGNIFICANT CHANGE UP (ref 8.4–10.5)
CHLORIDE SERPL-SCNC: 102 MMOL/L — SIGNIFICANT CHANGE UP (ref 96–108)
CO2 SERPL-SCNC: 23 MMOL/L — SIGNIFICANT CHANGE UP (ref 22–31)
CREAT SERPL-MCNC: 0.95 MG/DL — SIGNIFICANT CHANGE UP (ref 0.5–1.3)
CRP SERPL-MCNC: 14.99 MG/DL — HIGH (ref 0–0.4)
CULTURE RESULTS: SIGNIFICANT CHANGE UP
ERYTHROCYTE [SEDIMENTATION RATE] IN BLOOD: 34 MM/HR — HIGH (ref 0–15)
FERRITIN SERPL-MCNC: 564 NG/ML — HIGH (ref 30–400)
GLUCOSE SERPL-MCNC: 98 MG/DL — SIGNIFICANT CHANGE UP (ref 70–99)
HAV IGM SER-ACNC: SIGNIFICANT CHANGE UP
HBV CORE IGM SER-ACNC: SIGNIFICANT CHANGE UP
HBV SURFACE AG SER-ACNC: SIGNIFICANT CHANGE UP
HCT VFR BLD CALC: 40.6 % — SIGNIFICANT CHANGE UP (ref 39–50)
HCV AB S/CO SERPL IA: 0.07 S/CO — SIGNIFICANT CHANGE UP (ref 0–0.99)
HCV AB SERPL-IMP: SIGNIFICANT CHANGE UP
HGB BLD-MCNC: 13.4 G/DL — SIGNIFICANT CHANGE UP (ref 13–17)
LDH SERPL L TO P-CCNC: 200 U/L — SIGNIFICANT CHANGE UP (ref 50–242)
MAGNESIUM SERPL-MCNC: 2.1 MG/DL — SIGNIFICANT CHANGE UP (ref 1.6–2.6)
MCHC RBC-ENTMCNC: 29.5 PG — SIGNIFICANT CHANGE UP (ref 27–34)
MCHC RBC-ENTMCNC: 33 GM/DL — SIGNIFICANT CHANGE UP (ref 32–36)
MCV RBC AUTO: 89.2 FL — SIGNIFICANT CHANGE UP (ref 80–100)
NRBC # BLD: 0 /100 WBCS — SIGNIFICANT CHANGE UP (ref 0–0)
PHOSPHATE SERPL-MCNC: 2.8 MG/DL — SIGNIFICANT CHANGE UP (ref 2.5–4.5)
PLATELET # BLD AUTO: 133 K/UL — LOW (ref 150–400)
POTASSIUM SERPL-MCNC: 4 MMOL/L — SIGNIFICANT CHANGE UP (ref 3.5–5.3)
POTASSIUM SERPL-SCNC: 4 MMOL/L — SIGNIFICANT CHANGE UP (ref 3.5–5.3)
PROCALCITONIN SERPL-MCNC: 1.18 NG/ML — HIGH (ref 0.02–0.1)
PROT SERPL-MCNC: 6 G/DL — SIGNIFICANT CHANGE UP (ref 6–8.3)
RBC # BLD: 4.55 M/UL — SIGNIFICANT CHANGE UP (ref 4.2–5.8)
RBC # FLD: 12.2 % — SIGNIFICANT CHANGE UP (ref 10.3–14.5)
SARS-COV-2 RNA SPEC QL NAA+PROBE: SIGNIFICANT CHANGE UP
SODIUM SERPL-SCNC: 138 MMOL/L — SIGNIFICANT CHANGE UP (ref 135–145)
SPECIMEN SOURCE: SIGNIFICANT CHANGE UP
WBC # BLD: 7.14 K/UL — SIGNIFICANT CHANGE UP (ref 3.8–10.5)
WBC # FLD AUTO: 7.14 K/UL — SIGNIFICANT CHANGE UP (ref 3.8–10.5)

## 2020-08-01 PROCEDURE — 99232 SBSQ HOSP IP/OBS MODERATE 35: CPT

## 2020-08-01 RX ORDER — ACETAMINOPHEN 500 MG
650 TABLET ORAL ONCE
Refills: 0 | Status: COMPLETED | OUTPATIENT
Start: 2020-08-01 | End: 2020-08-01

## 2020-08-01 RX ORDER — ACETAMINOPHEN 500 MG
650 TABLET ORAL EVERY 6 HOURS
Refills: 0 | Status: DISCONTINUED | OUTPATIENT
Start: 2020-08-01 | End: 2020-08-02

## 2020-08-01 RX ORDER — LANOLIN ALCOHOL/MO/W.PET/CERES
1 CREAM (GRAM) TOPICAL AT BEDTIME
Refills: 0 | Status: DISCONTINUED | OUTPATIENT
Start: 2020-08-01 | End: 2020-08-02

## 2020-08-01 RX ORDER — SODIUM CHLORIDE 9 MG/ML
1000 INJECTION, SOLUTION INTRAVENOUS
Refills: 0 | Status: DISCONTINUED | OUTPATIENT
Start: 2020-08-01 | End: 2020-08-02

## 2020-08-01 RX ORDER — KETOROLAC TROMETHAMINE 30 MG/ML
15 SYRINGE (ML) INJECTION EVERY 8 HOURS
Refills: 0 | Status: DISCONTINUED | OUTPATIENT
Start: 2020-08-01 | End: 2020-08-02

## 2020-08-01 RX ADMIN — Medication 200 MILLIGRAM(S): at 17:46

## 2020-08-01 RX ADMIN — Medication 650 MILLIGRAM(S): at 01:15

## 2020-08-01 RX ADMIN — Medication 100 MILLIGRAM(S): at 06:20

## 2020-08-01 RX ADMIN — Medication 15 MILLIGRAM(S): at 12:21

## 2020-08-01 RX ADMIN — Medication 100 MILLIGRAM(S): at 14:20

## 2020-08-01 RX ADMIN — SODIUM CHLORIDE 150 MILLILITER(S): 9 INJECTION, SOLUTION INTRAVENOUS at 17:46

## 2020-08-01 RX ADMIN — Medication 200 MILLIGRAM(S): at 06:19

## 2020-08-01 RX ADMIN — Medication 100 MILLIGRAM(S): at 23:45

## 2020-08-01 RX ADMIN — Medication 15 MILLIGRAM(S): at 11:42

## 2020-08-01 RX ADMIN — Medication 650 MILLIGRAM(S): at 00:45

## 2020-08-01 NOTE — PROGRESS NOTE ADULT - ASSESSMENT
19M without PMHx, presenting fever, chills, abd pain, diarrhea for 1 day Found to have sepsis with mild colitis of large bowel. HIV negative.     Overall, Sepsis, fever, tachy, leucocytosis, colitis, leukocytosis with bandemia, mild hyperbilirubinemia. 19M without PMHx, presenting fever, chills, abd pain, diarrhea for 1 day Found to have sepsis with mild colitis of large bowel. HIV negative.     Overall, Sepsis, fever, tachy, leucocytosis, colitis, leukocytosis with bandemia, mild hyperbilirubinemia.   Pt clinically better with diarrhea slowing  WBC improved  However CRP higher  Rec:  1) continue IVF  2) check GI PCR in stool and C diff(no risk factors though)  3) for now continue cipro flagyl  If any s/s worsening zosyn + po vanco  4) Monitor clinically    Will tailor plan for ID issues  per course,results.Will defer to primary team on management of other issues.  Assessment, plan and recommendations as detailed above were discussed with the medical/primary  team.  Infectious Diseases Service will cover over weekend.  Please call [phone] if issues

## 2020-08-01 NOTE — PROGRESS NOTE ADULT - ASSESSMENT
19M c no signif PMHx, pw right sided colitis. Stool PCR resulted in Campylobacter. Due to persistent diarrhea c diff specimen sent. Clinically seems improved though CRP is rising.

## 2020-08-01 NOTE — CHART NOTE - NSCHARTNOTEFT_GEN_A_CORE
Medicine PA Episodic Note    Patient is a 19y old  Male who presents with a chief complaint of fever, abd pain, diarrhea (31 Jul 2020 16:59)      Vital Signs Last 24 Hrs  T(C): 37.7 (01 Aug 2020 00:12), Max: 39.3 (31 Jul 2020 05:28)  T(F): 99.8 (01 Aug 2020 00:12), Max: 102.8 (31 Jul 2020 05:28)  HR: 83 (01 Aug 2020 00:12) (81 - 104)  BP: 136/78 (01 Aug 2020 00:12) (108/58 - 136/78)  BP(mean): --  RR: 18 (01 Aug 2020 00:12) (17 - 18)  SpO2: 100% (01 Aug 2020 00:12) (95% - 100%)      Labs:                          13.8   12.43 )-----------( 147      ( 31 Jul 2020 05:57 )             41.6     07-31    135  |  100  |  14  ----------------------------<  129<H>  3.7   |  17<L>  |  1.14    Ca    8.4      31 Jul 2020 05:57  Phos  2.2     07-31  Mg     1.3     07-31    TPro  6.3  /  Alb  4.1  /  TBili  2.0<H>  /  DBili  0.3<H>  /  AST  21  /  ALT  13  /  AlkPhos  55  07-31        Radiology:    Physical Exam:  General: WN/WD NAD  Neurology: A&Ox3, nonfocal, DURAN x 4  Head:  Normocephalic, atraumatic  Respiratory: CTA B/L  CV: RRR, S1S2, no murmur  Abdominal: Soft, NT, ND no palpable mass  MSK: No edema, + peripheral pulses, FROM all 4 extremity    Assessment & Plan:  HPI:  19M c no signif PMHx, pw fever, chills, abd pain, diarrhea for 1 day.    Pt goes to the [employer], and was in [place] [employer] from Nov '19 to Feb '20, in [facility] from Feb '20 to Apr '20, and at home with his mother from Apr '20 to Jul '20. Pt states he [place] on [place]. 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 He has never had any symptoms like this before. Pt states he woke at 4:30AM this morning when fever, chills, which progressed to abd pain and 2 episodes of loose nonbloody diarrhea. Pt also reports some associated nausea, lightheaded, and SOB with his chills. Pt denies cough, CP, sore throat, HA, vomiting.    VS: Tm 103.2, P 110, /55, R 25, 96% RA  In the ED, received cipro, flagyl, toradol, NS 1L (30 Jul 2020 21:19)      Plan:    Abdominal Pain most likely due to colitis  - Tylenol PO  - Continue to monitor VSS      Endorse to AM team.   Follow up with Attending in AM.  Renny SILVESTRE  Dept of Medicine Medicine PA Episodic Note    Patient is a 19 y.o male with no PMHx complaining of abdominal pain. Patient admitted for colitis, and receiving treatment with Cipro/Flagyl. Patient states that pain is 6/7 on a pain scale when he moves and is concentrated in the lower quadrant. He states that he has been unable to sleep due to the pain. States that he feels nauseous, but no episodes of vomiting. Patient denies chest pain, dyspnea, shortness of breath, back pain, numbness, tingling, vomiting.        Vital Signs Last 24 Hrs  T(C): 37.7 (01 Aug 2020 00:12), Max: 39.3 (31 Jul 2020 05:28)  T(F): 99.8 (01 Aug 2020 00:12), Max: 102.8 (31 Jul 2020 05:28)  HR: 83 (01 Aug 2020 00:12) (81 - 104)  BP: 136/78 (01 Aug 2020 00:12) (108/58 - 136/78)  BP(mean): --  RR: 18 (01 Aug 2020 00:12) (17 - 18)  SpO2: 100% (01 Aug 2020 00:12) (95% - 100%)      Labs:                          13.8   12.43 )-----------( 147      ( 31 Jul 2020 05:57 )             41.6     07-31    135  |  100  |  14  ----------------------------<  129<H>  3.7   |  17<L>  |  1.14    Ca    8.4      31 Jul 2020 05:57  Phos  2.2     07-31  Mg     1.3     07-31    TPro  6.3  /  Alb  4.1  /  TBili  2.0<H>  /  DBili  0.3<H>  /  AST  21  /  ALT  13  /  AlkPhos  55  07-31        Radiology:    < from: CT Abdomen and Pelvis w/ IV Cont (07.30.20 @ 18:13) >    IMPRESSION:  Moderate amount of fluid and stool throughout colon. Mild cecal, ascending and proximal transverse colonic wall thickening possibly representing some form of colitis. Prominent fluid-filled terminal ileum without wall thickening. No proximal obstruction. Numerous right lower quadrant mildly prominent lymph nodes. The aforementioned findings may represent either an infectious or inflammatory etiology. Correlate clinically.    < end of copied text >      Physical Exam:  General: WN/WD NAD  Neurology: A&Ox3, nonfocal, DURAN x 4  Head:  Normocephalic, atraumatic  Respiratory: CTA B/L  CV: RRR, S1S2, no murmur  Abdominal: Soft, mild TTP in lower quadrant, + BS, ND no palpable mass  MSK: No edema, + peripheral pulses, FROM all 4 extremity    Assessment & Plan:  HPI:  19M c no signif PMHx, pw fever, chills, abd pain, diarrhea for 1 day.    Pt goes to the [employer], and was in [place] from Nov '19 to Feb '20, in [facility] from Feb '20 to Apr '20, and at home with his mother from Apr '20 to Jul '20. Pt states he drove up to the [place] on 7/26/20. 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 He has never had any symptoms like this before. Pt states he woke at 4:30AM this morning when fever, chills, which progressed to abd pain and 2 episodes of loose nonbloody diarrhea. Pt also reports some associated nausea, lightheaded, and SOB with his chills.     VS: Tm 103.2, P 110, /55, R 25, 96% RA  In the ED, received cipro, flagyl, toradol, NS 1L (30 Jul 2020 21:19)    Nonradiating abdominal pain, 6- 7 on a pain scale, VSS. Hemodynamically stable.    Plan:  Abdominal Pain most likely 2/2 to colitis. VSS. Hemodynamically stable.   - Tylenol  x 1 dose for pain  - Melatonin 1 mg x 1 dose for sleep  - Will continue to monitor patient.   - Consider imaging if patient's status worsens or patient's pain is not resolved.     Endorse to AM team.   Follow up with Attending in AM.  Renny SILVESTRE  Dept of Medicine  [phone]

## 2020-08-02 ENCOUNTER — TRANSCRIPTION ENCOUNTER (OUTPATIENT)
Age: 20
End: 2020-08-02

## 2020-08-02 VITALS
RESPIRATION RATE: 16 BRPM | TEMPERATURE: 98 F | OXYGEN SATURATION: 95 % | HEART RATE: 52 BPM | DIASTOLIC BLOOD PRESSURE: 74 MMHG | SYSTOLIC BLOOD PRESSURE: 130 MMHG

## 2020-08-02 LAB
ALBUMIN SERPL ELPH-MCNC: 3.5 G/DL — SIGNIFICANT CHANGE UP (ref 3.3–5)
ALP SERPL-CCNC: 52 U/L — SIGNIFICANT CHANGE UP (ref 40–120)
ALT FLD-CCNC: 14 U/L — SIGNIFICANT CHANGE UP (ref 10–45)
ANION GAP SERPL CALC-SCNC: 15 MMOL/L — SIGNIFICANT CHANGE UP (ref 5–17)
AST SERPL-CCNC: 20 U/L — SIGNIFICANT CHANGE UP (ref 10–40)
BILIRUB SERPL-MCNC: 0.4 MG/DL — SIGNIFICANT CHANGE UP (ref 0.2–1.2)
BUN SERPL-MCNC: 16 MG/DL — SIGNIFICANT CHANGE UP (ref 7–23)
CALCIUM SERPL-MCNC: 8.6 MG/DL — SIGNIFICANT CHANGE UP (ref 8.4–10.5)
CHLORIDE SERPL-SCNC: 102 MMOL/L — SIGNIFICANT CHANGE UP (ref 96–108)
CO2 SERPL-SCNC: 22 MMOL/L — SIGNIFICANT CHANGE UP (ref 22–31)
CREAT SERPL-MCNC: 1.02 MG/DL — SIGNIFICANT CHANGE UP (ref 0.5–1.3)
CRP SERPL-MCNC: 6.12 MG/DL — HIGH (ref 0–0.4)
CULTURE RESULTS: SIGNIFICANT CHANGE UP
ERYTHROCYTE [SEDIMENTATION RATE] IN BLOOD: 16 MM/HR — HIGH (ref 0–15)
GLUCOSE SERPL-MCNC: 101 MG/DL — HIGH (ref 70–99)
HCT VFR BLD CALC: 36.5 % — LOW (ref 39–50)
HGB BLD-MCNC: 11.9 G/DL — LOW (ref 13–17)
MAGNESIUM SERPL-MCNC: 1.9 MG/DL — SIGNIFICANT CHANGE UP (ref 1.6–2.6)
MCHC RBC-ENTMCNC: 29.4 PG — SIGNIFICANT CHANGE UP (ref 27–34)
MCHC RBC-ENTMCNC: 32.6 GM/DL — SIGNIFICANT CHANGE UP (ref 32–36)
MCV RBC AUTO: 90.1 FL — SIGNIFICANT CHANGE UP (ref 80–100)
NRBC # BLD: 0 /100 WBCS — SIGNIFICANT CHANGE UP (ref 0–0)
PHOSPHATE SERPL-MCNC: 4.5 MG/DL — SIGNIFICANT CHANGE UP (ref 2.5–4.5)
PLATELET # BLD AUTO: 139 K/UL — LOW (ref 150–400)
POTASSIUM SERPL-MCNC: 4.1 MMOL/L — SIGNIFICANT CHANGE UP (ref 3.5–5.3)
POTASSIUM SERPL-SCNC: 4.1 MMOL/L — SIGNIFICANT CHANGE UP (ref 3.5–5.3)
PROT SERPL-MCNC: 5.7 G/DL — LOW (ref 6–8.3)
RBC # BLD: 4.05 M/UL — LOW (ref 4.2–5.8)
RBC # FLD: 12.2 % — SIGNIFICANT CHANGE UP (ref 10.3–14.5)
SODIUM SERPL-SCNC: 139 MMOL/L — SIGNIFICANT CHANGE UP (ref 135–145)
SPECIMEN SOURCE: SIGNIFICANT CHANGE UP
WBC # BLD: 6.28 K/UL — SIGNIFICANT CHANGE UP (ref 3.8–10.5)
WBC # FLD AUTO: 6.28 K/UL — SIGNIFICANT CHANGE UP (ref 3.8–10.5)

## 2020-08-02 PROCEDURE — 83615 LACTATE (LD) (LDH) ENZYME: CPT

## 2020-08-02 PROCEDURE — 82728 ASSAY OF FERRITIN: CPT

## 2020-08-02 PROCEDURE — 74177 CT ABD & PELVIS W/CONTRAST: CPT

## 2020-08-02 PROCEDURE — 84100 ASSAY OF PHOSPHORUS: CPT

## 2020-08-02 PROCEDURE — 85730 THROMBOPLASTIN TIME PARTIAL: CPT

## 2020-08-02 PROCEDURE — 84295 ASSAY OF SERUM SODIUM: CPT

## 2020-08-02 PROCEDURE — 99232 SBSQ HOSP IP/OBS MODERATE 35: CPT

## 2020-08-02 PROCEDURE — 87324 CLOSTRIDIUM AG IA: CPT

## 2020-08-02 PROCEDURE — 96365 THER/PROPH/DIAG IV INF INIT: CPT | Mod: XU

## 2020-08-02 PROCEDURE — 85014 HEMATOCRIT: CPT

## 2020-08-02 PROCEDURE — 82248 BILIRUBIN DIRECT: CPT

## 2020-08-02 PROCEDURE — 86140 C-REACTIVE PROTEIN: CPT

## 2020-08-02 PROCEDURE — 87046 STOOL CULTR AEROBIC BACT EA: CPT

## 2020-08-02 PROCEDURE — 85027 COMPLETE CBC AUTOMATED: CPT

## 2020-08-02 PROCEDURE — 85652 RBC SED RATE AUTOMATED: CPT

## 2020-08-02 PROCEDURE — 83605 ASSAY OF LACTIC ACID: CPT

## 2020-08-02 PROCEDURE — 84132 ASSAY OF SERUM POTASSIUM: CPT

## 2020-08-02 PROCEDURE — 87045 FECES CULTURE AEROBIC BACT: CPT

## 2020-08-02 PROCEDURE — 71045 X-RAY EXAM CHEST 1 VIEW: CPT

## 2020-08-02 PROCEDURE — U0003: CPT

## 2020-08-02 PROCEDURE — 80074 ACUTE HEPATITIS PANEL: CPT

## 2020-08-02 PROCEDURE — 87507 IADNA-DNA/RNA PROBE TQ 12-25: CPT

## 2020-08-02 PROCEDURE — 87449 NOS EACH ORGANISM AG IA: CPT

## 2020-08-02 PROCEDURE — 82435 ASSAY OF BLOOD CHLORIDE: CPT

## 2020-08-02 PROCEDURE — 84145 PROCALCITONIN (PCT): CPT

## 2020-08-02 PROCEDURE — 82947 ASSAY GLUCOSE BLOOD QUANT: CPT

## 2020-08-02 PROCEDURE — 85610 PROTHROMBIN TIME: CPT

## 2020-08-02 PROCEDURE — 82803 BLOOD GASES ANY COMBINATION: CPT

## 2020-08-02 PROCEDURE — 82330 ASSAY OF CALCIUM: CPT

## 2020-08-02 PROCEDURE — 87177 OVA AND PARASITES SMEARS: CPT

## 2020-08-02 PROCEDURE — 82247 BILIRUBIN TOTAL: CPT

## 2020-08-02 PROCEDURE — 99285 EMERGENCY DEPT VISIT HI MDM: CPT | Mod: 25

## 2020-08-02 PROCEDURE — 80053 COMPREHEN METABOLIC PANEL: CPT

## 2020-08-02 PROCEDURE — 99239 HOSP IP/OBS DSCHRG MGMT >30: CPT

## 2020-08-02 PROCEDURE — 86703 HIV-1/HIV-2 1 RESULT ANTBDY: CPT

## 2020-08-02 PROCEDURE — 86769 SARS-COV-2 COVID-19 ANTIBODY: CPT

## 2020-08-02 PROCEDURE — 96375 TX/PRO/DX INJ NEW DRUG ADDON: CPT

## 2020-08-02 PROCEDURE — 83735 ASSAY OF MAGNESIUM: CPT

## 2020-08-02 PROCEDURE — 83690 ASSAY OF LIPASE: CPT

## 2020-08-02 PROCEDURE — 87040 BLOOD CULTURE FOR BACTERIA: CPT

## 2020-08-02 RX ORDER — ACETAMINOPHEN 500 MG
2 TABLET ORAL
Qty: 0 | Refills: 0 | DISCHARGE
Start: 2020-08-02

## 2020-08-02 RX ORDER — AZITHROMYCIN 500 MG/1
1 TABLET, FILM COATED ORAL
Qty: 3 | Refills: 0
Start: 2020-08-02 | End: 2020-08-04

## 2020-08-02 RX ORDER — AZITHROMYCIN 500 MG/1
500 TABLET, FILM COATED ORAL DAILY
Refills: 0 | Status: DISCONTINUED | OUTPATIENT
Start: 2020-08-02 | End: 2020-08-02

## 2020-08-02 RX ORDER — LANOLIN ALCOHOL/MO/W.PET/CERES
1 CREAM (GRAM) TOPICAL
Qty: 0 | Refills: 0 | DISCHARGE
Start: 2020-08-02

## 2020-08-02 RX ORDER — AZITHROMYCIN 500 MG/1
1 TABLET, FILM COATED ORAL
Qty: 2 | Refills: 0
Start: 2020-08-02 | End: 2020-08-03

## 2020-08-02 RX ADMIN — AZITHROMYCIN 500 MILLIGRAM(S): 500 TABLET, FILM COATED ORAL at 13:00

## 2020-08-02 RX ADMIN — Medication 100 MILLIGRAM(S): at 06:41

## 2020-08-02 RX ADMIN — Medication 200 MILLIGRAM(S): at 06:41

## 2020-08-02 RX ADMIN — Medication 1 MILLIGRAM(S): at 00:51

## 2020-08-02 NOTE — DISCHARGE NOTE PROVIDER - NSDCFUADDAPPT_GEN_ALL_CORE_FT
Please follow up with Dr Lau tomorrow morning at 7:15am at the [facility] office for continued monitoring and management.    Please follow up with gastroenterologist at the [facility] office after discharge for continued monitoring and management.

## 2020-08-02 NOTE — DISCHARGE NOTE PROVIDER - PROVIDER TOKENS
FREE:[LAST:[[employer]],PHONE:[(   )    -],FAX:[(   )    -],SCHEDULEDAPPT:[08/03/2020],SCHEDULEDAPPTTIME:[12:00 AM]]

## 2020-08-02 NOTE — PROGRESS NOTE ADULT - ASSESSMENT
19M c no signif PMHx, pw right sided colitis. Stool PCR and culture resulted in Campylobacter. Clinically he is much improved and ready for discharge home today.

## 2020-08-02 NOTE — DISCHARGE NOTE NURSING/CASE MANAGEMENT/SOCIAL WORK - NSDCFUADDAPPT_GEN_ALL_CORE_FT
Please follow up with Dr Lau tomorrow morning at 7:15am at the [facility] office for continued monitoring and management.    Please follow up with gastroenterologist at the [facility] office after discharge for continued monitoring and management.
You can access the FollowMyHealth Patient Portal offered by [facility] by registering at the following website: [email or web address]. By joining [employer]’s FollowMyHealth portal, you will also be able to view your health information using other applications (apps) compatible with our system.

## 2020-08-02 NOTE — PROGRESS NOTE ADULT - ATTENDING COMMENTS
Stacey Walters DO  Hospitalist  Pager: [phone] 35 minutes were spent coordinating discharge today.     Stacey Walters DO  Hospitalist  Pager: [phone]

## 2020-08-02 NOTE — PROGRESS NOTE ADULT - PROBLEM SELECTOR PLAN 1
- cont Cipro/Flagyl, lymphadenopathy in RLQ likely due to infectious colitis, Pt w/o sign family h/o IBD  - noted to have worsening: bilirubinemia 1.4>2 w/o jaundice with elevated INR1.22>1.36 and PT14.4>15.8, bandemia 9>14   - ID consulted  - CRP, ESR, direct/indirect bili ordered  - check acute hepatitis panel   - f/u blood cx and stool cx  - f/u o&p  - low residual diet
- discontinue cipro/flagyl, will give 3 days of azithromycin 500mg qd  - Stool PCR and culture - Campylobacter  - noted to have worsening: bilirubinemia 1.4>2 w/o jaundice with elevated INR1.22>1.36 and PT14.4>15.8, bandemia 9>14   - ID consulted  - CRP, ESR trending down  - check acute hepatitis panel negative  - f/u blood cx no growth  - f/u o&p - no growth  - low residual diet  - f/u c diff tox - negative
- cont Cipro/Flagyl, lymphadenopathy in RLQ likely due to infectious colitis, Pt w/o sig family h/o IBD  - Stool PCR - Campylobacter  - noted to have worsening: bilirubinemia 1.4>2 w/o jaundice with elevated INR1.22>1.36 and PT14.4>15.8, bandemia 9>14   - ID consulted  - CRP, ESR, direct/indirect bili ordered; CRP rising, ESR not significantly elevated, will trend  - check acute hepatitis panel   - f/u blood cx and stool cx - ng so far  - f/u o&p  - low residual diet  - f/u c diff tox

## 2020-08-02 NOTE — PROGRESS NOTE ADULT - ASSESSMENT
19M without PMHx, presenting fever, chills, abd pain, diarrhea for 1 day Found to have sepsis with mild colitis of large bowel. HIV negative.     Overall, Sepsis, fever, tachy, leucocytosis, colitis, leukocytosis with bandemia, mild hyperbilirubinemia.   Pt clinically better with diarrhea slowing  WBC improved  GI PCR campy  Rec:  1) transition to po feeding and hydration   2) can change abx to azithromycin 500 mg po daily X 3 days  3) NP will contact his physician at [facility]-pt says mess has had some uncooked poultry      Will tailor plan for ID issues  per course,results.Will defer to primary team on management of other issues.  Assessment, plan and recommendations as detailed above were discussed with the medical/primary  team NP  Infectious Diseases Service will cover over weekend.  Please call [phone] if issues

## 2020-08-02 NOTE — PROGRESS NOTE ADULT - PROBLEM SELECTOR PLAN 3
no PMD, but needs GI f/u at discharge to f/u for resolution of lymphadenopathy -no PMD, but needs GI f/u at discharge to f/u for resolution of lymphadenopathy

## 2020-08-02 NOTE — PROGRESS NOTE ADULT - SUBJECTIVE AND OBJECTIVE BOX
ID Coverage    Patient is a 19y old  Male who presents with a chief complaint of fever, abd pain, diarrhea (31 Jul 2020 16:59)    Being followed by ID for colitis    Interval history:No diarrhea today  had "15" loose Bms yesterday  No acute events      ROS:  No cough,SOB,CP  No N/V/D./abd pain  No other complaints      Antimicrobials:    ciprofloxacin   IVPB 400 milliGRAM(s) IV Intermittent every 12 hours  metroNIDAZOLE  IVPB 500 milliGRAM(s) IV Intermittent every 8 hours    Other medications reviewed    Vital Signs Last 24 Hrs  T(C): 36.6 (08-01-20 @ 06:33), Max: 37.7 (08-01-20 @ 00:12)  T(F): 97.9 (08-01-20 @ 06:33), Max: 99.8 (08-01-20 @ 00:12)  HR: 63 (08-01-20 @ 06:33) (63 - 83)  BP: 123/76 (08-01-20 @ 06:33) (116/79 - 136/78)  BP(mean): --  RR: 18 (08-01-20 @ 06:33) (17 - 18)  SpO2: 100% (08-01-20 @ 06:33) (100% - 100%)    Physical Exam:        HEENT PERRLA EOMI    No oral exudate or erythema    Chest Good AE,CTA    CVS RRR S1 S2 WNl No murmur or rub or gallop    Abd soft BS normal No tenderness no masses    IV site no erythema tenderness or discharge    CNS AAO X 3 no focal    Lab Data:                          13.4   7.14  )-----------( 133      ( 01 Aug 2020 06:53 )             40.6     WBC Count: 7.14 (08-01-20 @ 06:53)  WBC Count: 12.43 (07-31-20 @ 05:57)  WBC Count: 12.24 (07-30-20 @ 17:21)    08-01    138  |  102  |  11  ----------------------------<  98  4.0   |  23  |  0.95    Ca    8.9      01 Aug 2020 06:53  Phos  2.2     07-31  Mg     1.3     07-31    TPro  6.0  /  Alb  3.7  /  TBili  0.8  /  DBili  x   /  AST  23  /  ALT  14  /  AlkPhos  50  08-01    C-Reactive Protein, Serum (08.01.20 @ 06:53)    C-Reactive Protein, Serum: 14.99 mg/dL    C-Reactive Protein, Serum (07.31.20 @ 05:57)    C-Reactive Protein, Serum: 8.92 mg/dL        Culture - Stool (collected 31 Jul 2020 06:42)  Source: .Stool Feces  Preliminary Report (01 Aug 2020 09:33):    No enteric pathogens to date: Final culture pending    Culture - Blood (collected 30 Jul 2020 23:15)  Source: .Blood Blood-Peripheral  Preliminary Report (01 Aug 2020 01:03):    No growth to date.    Culture - Blood (collected 30 Jul 2020 23:15)  Source: .Blood Blood-Peripheral  Preliminary Report (01 Aug 2020 01:03):    No growth to date.        < from: CT Abdomen and Pelvis w/ IV Cont (07.30.20 @ 18:13) >    IMPRESSION:  Moderate amount of fluid and stool throughout colon. Mild cecal, ascending and proximal transverse colonic wall thickening possibly representing some form of colitis. Prominent fluid-filled terminal ileum without wall thickening. No proximal obstruction. Numerous right lower quadrant mildly prominent lymph nodes. The aforementioned findings may represent either an infectious or inflammatory etiology. Correlate clinically.            < end of copied text >        COVID-19 PCR . (07.31.20 @ 17:56)    COVID-19 PCR: NotDetec: Testing is performed using polymerase chain reaction (PCR) or  transcription mediated amplification (TMA). This COVID-19 (SARS-CoV-2)  nucleic acid amplification test was validated by [facility] and is  in use under the FDA Emergency Use Authorization (EUA) for clinical labs  CLIA-certified to perform high complexity testing. Test results should be  correlated with clinical presentation, patient history, and epidemiology.          COVID-19 PCR . (07.30.20 @ 17:21)    COVID-19 PCR: NotDetec: This test has been validated by [email or web address] to be accurate;  though it has not been FDA cleared/approved by the usual pathway.  As with all laboratory tests, results should be correlated with clinical  findings.  https://www.fda.gov/media/175631/download  https://www.fda.gov/media/705985/download
Patient is a 19y old  Male who presents with a chief complaint of fever, abd pain, diarrhea (30 Jul 2020 21:19)      SUBJECTIVE / OVERNIGHT EVENTS: Patient seen and examined at bedside. States that he continues to have abd pain, and diarrhea, describes a non bloody, and loose. He does not report recurrent episodes of diarrhea in the past. Denies CP, SOB, dizziness, and v. Has intermittent nausea     ROS:  All other review of systems negative    Allergies    No Known Allergies    Intolerances        MEDICATIONS  (STANDING):  ciprofloxacin   IVPB 400 milliGRAM(s) IV Intermittent every 12 hours  lactated ringers. 1000 milliLiter(s) (200 mL/Hr) IV Continuous <Continuous>  metroNIDAZOLE  IVPB 500 milliGRAM(s) IV Intermittent every 8 hours  potassium phosphate IVPB 15 milliMole(s) IV Intermittent once    MEDICATIONS  (PRN):      Vital Signs Last 24 Hrs  T(C): 37 (31 Jul 2020 11:17), Max: 39.6 (30 Jul 2020 16:00)  T(F): 98.6 (31 Jul 2020 11:17), Max: 103.2 (30 Jul 2020 16:00)  HR: 81 (31 Jul 2020 11:17) (81 - 110)  BP: 130/76 (31 Jul 2020 11:17) (103/56 - 130/76)  BP(mean): --  RR: 17 (31 Jul 2020 11:17) (14 - 25)  SpO2: 100% (31 Jul 2020 11:17) (95% - 100%)  CAPILLARY BLOOD GLUCOSE        I&O's Summary    30 Jul 2020 07:01  -  31 Jul 2020 07:00  --------------------------------------------------------  IN: 2350 mL / OUT: 1000 mL / NET: 1350 mL    31 Jul 2020 07:01  -  31 Jul 2020 12:57  --------------------------------------------------------  IN: 840 mL / OUT: 0 mL / NET: 840 mL        PHYSICAL EXAM:  GENERAL: NAD, well-developed  HEAD:  Atraumatic, Normocephalic  EYES: EOMI, PERRLA, conjunctiva and sclera clear  NECK: Supple, No JVD  CHEST/LUNG: Clear to auscultation bilaterally; No wheeze  HEART: Regular rate and rhythm; No murmurs, rubs, or gallops  ABDOMEN: Soft, +TTP, Nondistended; Bowel sounds present  EXTREMITIES:  2+ Peripheral Pulses, No clubbing, cyanosis, or edema  NEUROLOGY: AAOx3, non-focal  PSYCH: calm  SKIN: No rashes or lesions    LABS:                        13.8   12.43 )-----------( 147      ( 31 Jul 2020 05:57 )             41.6     07-31    135  |  100  |  14  ----------------------------<  129<H>  3.7   |  17<L>  |  1.14    Ca    8.4      31 Jul 2020 05:57  Phos  2.2     07-31  Mg     1.3     07-31    TPro  6.3  /  Alb  4.1  /  TBili  2.0<H>  /  DBili  x   /  AST  21  /  ALT  13  /  AlkPhos  55  07-31    PT/INR - ( 31 Jul 2020 09:11 )   PT: 15.8 sec;   INR: 1.36 ratio         PTT - ( 30 Jul 2020 17:21 )  PTT:29.2 sec          RADIOLOGY & ADDITIONAL TESTS:    Imaging Personally Reviewed:  CT a/p reviewed sign for lymphadenopathy in RLQ    Care Discussed with Consultants/Other Providers: Medicine NP
[facility] Division of Hospital Medicine  Stacey Walters   Pager (SELENE-SURYA, 5T-7K): [phone]  Other Times:  [phone]    Patient is a 19y old  Male who presents with a chief complaint of fever, abd pain, diarrhea (01 Aug 2020 10:09)      SUBJECTIVE / OVERNIGHT EVENTS: Overnight, patient had a headache and some abdominal pain, and was having trouble sleeping. He received tylenol and melatonin which helped his headache and helped him sleep but did not help much with the abdominal pain. This morning he had one formed stool that was greenish. He feels a little bit better, with improved appetite. He is concerned about missing school and wishes to go home so he can attend school and not be so far behind. I also spoke with his mother and father on the phone. No nausea/vomiting/fevers/chills.    REVIEW OF SYSTEMS:    CONSTITUTIONAL: No weakness, weight loss, fevers or chills  EYES/ENT: No visual changes;  No vertigo or throat pain   NECK: No pain or stiffness  RESPIRATORY: No cough, wheezing, hemoptysis; No shortness of breath  CARDIOVASCULAR: No chest pain or palpitations, CABRERA  GASTROINTESTINAL: No nausea, vomiting, or hematemesis; No constipation. No melena or hematochezia. +diarrhea, abdominal pain  GENITOURINARY: No dysuria, frequency or hematuria  NEUROLOGICAL: No numbness or weakness, AAOX3  SKIN: No itching, rashes  MUSCULOSKELETAL: no joint erythema, no joint swelling  PSYCHIATRIC: no depression, no anxiety    MEDICATIONS  (STANDING):  ciprofloxacin   IVPB 400 milliGRAM(s) IV Intermittent every 12 hours  lactated ringers. 1000 milliLiter(s) (150 mL/Hr) IV Continuous <Continuous>  melatonin 1 milliGRAM(s) Oral at bedtime  metroNIDAZOLE  IVPB 500 milliGRAM(s) IV Intermittent every 8 hours    MEDICATIONS  (PRN):  acetaminophen   Tablet .. 650 milliGRAM(s) Oral every 6 hours PRN Temp greater or equal to 38C (100.4F), Mild Pain (1 - 3)  ketorolac   Injectable 15 milliGRAM(s) IV Push every 8 hours PRN Moderate Pain (4 - 6)    I&O's Summary    31 Jul 2020 07:01  -  01 Aug 2020 07:00  --------------------------------------------------------  IN: 840 mL / OUT: 0 mL / NET: 840 mL    01 Aug 2020 07:01  -  01 Aug 2020 14:34  --------------------------------------------------------  IN: 100 mL / OUT: 0 mL / NET: 100 mL    PHYSICAL EXAM:  Vital Signs Last 24 Hrs  T(C): 36.9 (01 Aug 2020 10:11), Max: 37.7 (01 Aug 2020 00:12)  T(F): 98.5 (01 Aug 2020 10:11), Max: 99.8 (01 Aug 2020 00:12)  HR: 70 (01 Aug 2020 10:11) (63 - 83)  BP: 113/66 (01 Aug 2020 10:11) (113/66 - 136/78)  BP(mean): --  RR: 18 (01 Aug 2020 10:11) (17 - 18)  SpO2: 99% (01 Aug 2020 10:11) (99% - 100%)  CONSTITUTIONAL: NAD, well-developed, well-groomed  EYES: PERRLA; conjunctiva and sclera clear  ENMT: Moist oral mucosa, no pharyngeal injection or exudates; normal dentition  NECK: Supple, no palpable masses; no thyromegaly  RESPIRATORY: Normal respiratory effort; lungs are clear to auscultation bilaterally  CARDIOVASCULAR: Regular rate and rhythm, normal S1 and S2, no murmur/rub/gallop; No lower extremity edema; Peripheral pulses are 2+ bilaterally  ABDOMEN: mildly tender to palpation, normoactive bowel sounds, no rebound/guarding; No hepatosplenomegaly  MUSCULOSKELETAL: no clubbing or cyanosis of digits; no joint swelling or tenderness to palpation  PSYCH: A+O to person, place, and time; affect appropriate  NEUROLOGY: CN 2-12 are intact and symmetric; no gross sensory deficits   SKIN: No rashes; no palpable lesions    LABS:                        13.4   7.14  )-----------( 133      ( 01 Aug 2020 06:53 )             40.6     08-01    138  |  102  |  11  ----------------------------<  98  4.0   |  23  |  0.95    Ca    8.9      01 Aug 2020 06:53  Phos  2.8     08-01  Mg     2.1     08-01    TPro  6.0  /  Alb  3.7  /  TBili  0.8  /  DBili  x   /  AST  23  /  ALT  14  /  AlkPhos  50  08-01    PT/INR - ( 31 Jul 2020 09:11 )   PT: 15.8 sec;   INR: 1.36 ratio         PTT - ( 30 Jul 2020 17:21 )  PTT:29.2 sec        GI PCR Panel, Stool (collected 01 Aug 2020 05:35)  Source: .Stool Feces  Final Report (01 Aug 2020 13:05):    Campylobacter species    DETECTED by PCR    *******Please Note:*******    GI panel PCR evaluates for:    Campylobacter, Plesiomonas shigelloides, Salmonella,    Vibrio, Yersinia enterocolitica, Enteroaggregative    Escherichia coli (EAEC), Enteropathogenic E.coli (EPEC),    Enterotoxigenic E. coli (ETEC) lt/st, Shiga-like    toxin-producing E. coli (STEC) stx1/stx2,    Shigella/ Enteroinvasive E. coli (EIEC), Cryptosporidium,    Cyclospora cayetanensis, Entamoeba histolytica,    Giardia lamblia, Adenovirus F 40/41, Astrovirus,    Norovirus GI/GII, Rotavirus A, Sapovirus    Culture - Stool (collected 31 Jul 2020 06:42)  Source: .Stool Feces  Preliminary Report (01 Aug 2020 09:33):    No enteric pathogens to date: Final culture pending    Culture - Blood (collected 30 Jul 2020 23:15)  Source: .Blood Blood-Peripheral  Preliminary Report (01 Aug 2020 01:03):    No growth to date.    Culture - Blood (collected 30 Jul 2020 23:15)  Source: .Blood Blood-Peripheral  Preliminary Report (01 Aug 2020 01:03):    No growth to date.    COORDINATION OF CARE:  Care Discussed with Consultants/Other Providers: Y  Prior or Outpatient Records Reviewed: DANNI
[facility] Division of Hospital Medicine  Stacey DO Romeo  Pager (JERRY, 0N-5P): [phone]  Other Times:  [phone]    Patient is a 19y old  Male who presents with a chief complaint of fever, abd pain, diarrhea (02 Aug 2020 09:54)      SUBJECTIVE / OVERNIGHT EVENTS: No overnight events. Today patient says his stool is less and it is formed rather than loose. He feels well and he feels well enough to go home. Appetite is good.  ADDITIONAL REVIEW OF SYSTEMS:    CONSTITUTIONAL: No weakness, weight loss, fevers or chills  EYES/ENT: No visual changes;  No vertigo or throat pain   NECK: No pain or stiffness  RESPIRATORY: No cough, wheezing, hemoptysis; No shortness of breath  CARDIOVASCULAR: No chest pain or palpitations, CABRERA  GASTROINTESTINAL: No nausea, vomiting, or hematemesis; No constipation. No melena or hematochezia. +diarrhea improving, no abdominal pain  GENITOURINARY: No dysuria, frequency or hematuria  NEUROLOGICAL: No numbness or weakness, AAOX3  SKIN: No itching, rashes  MUSCULOSKELETAL: no joint erythema, no joint swelling  PSYCHIATRIC: no depression, no anxiety    MEDICATIONS  (STANDING):  azithromycin   Tablet 500 milliGRAM(s) Oral daily  lactated ringers. 1000 milliLiter(s) (150 mL/Hr) IV Continuous <Continuous>  lactated ringers. 1000 milliLiter(s) (150 mL/Hr) IV Continuous <Continuous>  melatonin 1 milliGRAM(s) Oral at bedtime    MEDICATIONS  (PRN):  acetaminophen   Tablet .. 650 milliGRAM(s) Oral every 6 hours PRN Temp greater or equal to 38C (100.4F), Mild Pain (1 - 3)  ketorolac   Injectable 15 milliGRAM(s) IV Push every 8 hours PRN Moderate Pain (4 - 6)    I&O's Summary    01 Aug 2020 07:01  -  02 Aug 2020 07:00  --------------------------------------------------------  IN: 100 mL / OUT: 0 mL / NET: 100 mL        PHYSICAL EXAM:  Vital Signs Last 24 Hrs  T(C): 36.8 (02 Aug 2020 07:30), Max: 36.8 (02 Aug 2020 07:30)  T(F): 98.3 (02 Aug 2020 07:30), Max: 98.3 (02 Aug 2020 07:30)  HR: 59 (02 Aug 2020 07:30) (57 - 68)  BP: 123/74 (02 Aug 2020 07:30) (123/74 - 125/78)  BP(mean): --  RR: 16 (02 Aug 2020 07:30) (16 - 18)  SpO2: 98% (02 Aug 2020 07:30) (96% - 98%)  CONSTITUTIONAL: NAD, well-developed, well-groomed  EYES: PERRLA; conjunctiva and sclera clear  ENMT: Moist oral mucosa, no pharyngeal injection or exudates; normal dentition  NECK: Supple, no palpable masses; no thyromegaly  RESPIRATORY: Normal respiratory effort; lungs are clear to auscultation bilaterally  CARDIOVASCULAR: Regular rate and rhythm, normal S1 and S2, no murmur/rub/gallop; No lower extremity edema; Peripheral pulses are 2+ bilaterally  ABDOMEN: mildly tender to palpation, normoactive bowel sounds, no rebound/guarding; No hepatosplenomegaly  MUSCULOSKELETAL: no clubbing or cyanosis of digits; no joint swelling or tenderness to palpation  PSYCH: A+O to person, place, and time; affect appropriate  NEUROLOGY: CN 2-12 are intact and symmetric; no gross sensory deficits   SKIN: No rashes; no palpable lesions    LABS:                        11.9   6.28  )-----------( 139      ( 02 Aug 2020 04:53 )             36.5     08-02    139  |  102  |  16  ----------------------------<  101<H>  4.1   |  22  |  1.02    Ca    8.6      02 Aug 2020 04:53  Phos  4.5     08-02  Mg     1.9     08-02    TPro  5.7<L>  /  Alb  3.5  /  TBili  0.4  /  DBili  x   /  AST  20  /  ALT  14  /  AlkPhos  52  08-02              GI PCR Panel, Stool (collected 01 Aug 2020 05:35)  Source: .Stool Feces  Final Report (01 Aug 2020 13:05):    Campylobacter species    DETECTED by PCR    *******Please Note:*******    GI panel PCR evaluates for:    Campylobacter, Plesiomonas shigelloides, Salmonella,    Vibrio, Yersinia enterocolitica, Enteroaggregative    Escherichia coli (EAEC), Enteropathogenic E.coli (EPEC),    Enterotoxigenic E. coli (ETEC) lt/st, Shiga-like    toxin-producing E. coli (STEC) stx1/stx2,    Shigella/ Enteroinvasive E. coli (EIEC), Cryptosporidium,    Cyclospora cayetanensis, Entamoeba histolytica,    Giardia lamblia, Adenovirus F 40/41, Astrovirus,    Norovirus GI/GII, Rotavirus A, Sapovirus    Culture - Stool (collected 31 Jul 2020 06:42)  Source: .Stool Feces  Final Report (02 Aug 2020 12:10):    Few Campylobacter jejuni (Stool culture examined for Salmonella,    Shigella, Campylobacter, Aeromonas, Plesiomonas, Vibrio, E.coli O157 and    Yersinia)    Culture - Blood (collected 30 Jul 2020 23:15)  Source: .Blood Blood-Peripheral  Preliminary Report (01 Aug 2020 01:03):    No growth to date.    Culture - Blood (collected 30 Jul 2020 23:15)  Source: .Blood Blood-Peripheral  Preliminary Report (01 Aug 2020 01:03):    No growth to date.      COORDINATION OF CARE:  Care Discussed with Consultants/Other Providers Y infectious disease  Prior or Outpatient Records Reviewed Y
ID Coverage  Patient is a 19y old  Male who presents with a chief complaint of fever, abd pain, diarrhea (02 Aug 2020 09:42)    Being followed by ID for campylobacter AGE/colitis    Interval history:feels better  diarrhea resolving-formed stools  no abd pain  tiolerating PO   No acute events      ROS:  No cough,SOB,CP  No N/V/D./abd pain  No other complaints      Antimicrobials:  cipro-flagyl being DCed  to start on zithromax today     Other medications reviewed  MEDICATIONS  (STANDING):  lactated ringers. 1000 milliLiter(s) (150 mL/Hr) IV Continuous <Continuous>  lactated ringers. 1000 milliLiter(s) (150 mL/Hr) IV Continuous <Continuous>  melatonin 1 milliGRAM(s) Oral at bedtime      Vital Signs Last 24 Hrs  T(C): 36.8 (08-02-20 @ 07:30), Max: 36.9 (08-01-20 @ 10:11)  T(F): 98.3 (08-02-20 @ 07:30), Max: 98.5 (08-01-20 @ 10:11)  HR: 59 (08-02-20 @ 07:30) (57 - 70)  BP: 123/74 (08-02-20 @ 07:30) (113/66 - 125/78)  BP(mean): --  RR: 16 (08-02-20 @ 07:30) (16 - 18)  SpO2: 98% (08-02-20 @ 07:30) (96% - 99%)    Physical Exam:        HEENT PERRLA EOMI    No oral exudate or erythema    Chest Good AE,CTA    CVS RRR S1 S2 WNl No murmur or rub or gallop    Abd soft BS normal No tenderness no masses    IV site no erythema tenderness or discharge    CNS AAO X 3 no focal    Lab Data:                          11.9   6.28  )-----------( 139      ( 02 Aug 2020 04:53 )             36.5       08-02    139  |  102  |  16  ----------------------------<  101<H>  4.1   |  22  |  1.02    Ca    8.6      02 Aug 2020 04:53  Phos  4.5     08-02  Mg     1.9     08-02    TPro  5.7<L>  /  Alb  3.5  /  TBili  0.4  /  DBili  x   /  AST  20  /  ALT  14  /  AlkPhos  52  08-02        GI PCR Panel, Stool (collected 01 Aug 2020 05:35)  Source: .Stool Feces  Final Report (01 Aug 2020 13:05):    Campylobacter species    DETECTED by PCR    *******Please Note:*******    GI panel PCR evaluates for:    Campylobacter, Plesiomonas shigelloides, Salmonella,    Vibrio, Yersinia enterocolitica, Enteroaggregative    Escherichia coli (EAEC), Enteropathogenic E.coli (EPEC),    Enterotoxigenic E. coli (ETEC) lt/st, Shiga-like    toxin-producing E. coli (STEC) stx1/stx2,    Shigella/ Enteroinvasive E. coli (EIEC), Cryptosporidium,    Cyclospora cayetanensis, Entamoeba histolytica,    Giardia lamblia, Adenovirus F 40/41, Astrovirus,    Norovirus GI/GII, Rotavirus A, Sapovirus    Culture - Stool (collected 31 Jul 2020 06:42)  Source: .Stool Feces  Preliminary Report (01 Aug 2020 09:33):    No enteric pathogens to date: Final culture pending    Culture - Blood (collected 30 Jul 2020 23:15)  Source: .Blood Blood-Peripheral  Preliminary Report (01 Aug 2020 01:03):    No growth to date.    Culture - Blood (collected 30 Jul 2020 23:15)  Source: .Blood Blood-Peripheral  Preliminary Report (01 Aug 2020 01:03):    No growth to date.        Clostridium difficile GDH Interpretation: Negative for toxigenic C. Difficile.  This specimen is negative for C.  Difficile glutamate dehydrogenase (GDH) antigen and negative for C.  Difficile Toxins A & B, by EIA.  GDH is a highly sensitive screening  marker for C. Difficile that is produced in large amounts by all C.  Difficile strains, both toxigenic and nontoxigenic.  This assay has not  been validated as a test of cure.  Repeat testing during the same episode  of diarrhea is of limited value and is discouraged.  The results of this  assay should always be interpreted in conjunction with pateint's clinical  history. (08-01-20 @ 11:18)      < from: CT Abdomen and Pelvis w/ IV Cont (07.30.20 @ 18:13) >  IMPRESSION:  Moderate amount of fluid and stool throughout colon. Mild cecal, ascending and proximal transverse colonic wall thickening possibly representing some form of colitis. Prominent fluid-filled terminal ileum without wall thickening. No proximal obstruction. Numerous right lower quadrant mildly prominent lymph nodes. The aforementioned findings may represent either an infectious or inflammatory etiology. Correlate clinically.            < end of copied text >

## 2020-08-02 NOTE — DISCHARGE NOTE PROVIDER - NSDCCPCAREPLAN_GEN_ALL_CORE_FT
PRINCIPAL DISCHARGE DIAGNOSIS  Diagnosis: Colitis  Assessment and Plan of Treatment: You were found to have campylobacter infection. Campylobacter can cause inflammation of the colol (colitis) that results in fever and diarrhea. Campylobacter infection is a common foodborne illness. You usually get it from eating contaminated food, especially raw or undercooked poultry. You may also get it from drinking contaminated water or raw milk.  You were treated with antibiotics and you should continue to take your antibiotic as prescribed by your doctor. Drink plenty of fluid to stay hydrated. PRINCIPAL DISCHARGE DIAGNOSIS  Diagnosis: Colitis  Assessment and Plan of Treatment: You were found to have campylobacter infection. Campylobacter can cause inflammation of the colol (colitis) that results in fever and diarrhea. Campylobacter infection is a common foodborne illness. You usually get it from eating contaminated food, especially raw or undercooked poultry. You may also get it from drinking contaminated water or raw milk.   You were treated with antibiotics and you should continue to take your antibiotic as prescribed by your doctor. Drink plenty of fluid to stay hydrated. Please follow up with your primary care physician 1-2 weeks after discharge for continued monitoring and management.

## 2020-08-02 NOTE — PROGRESS NOTE ADULT - PROBLEM SELECTOR PROBLEM 2
Sepsis, due to unspecified organism, unspecified whether acute organ dysfunction present

## 2020-08-02 NOTE — PROGRESS NOTE ADULT - REASON FOR ADMISSION
fever, abd pain, diarrhea

## 2020-08-02 NOTE — DISCHARGE NOTE PROVIDER - NSDCMRMEDTOKEN_GEN_ALL_CORE_FT
acetaminophen 325 mg oral tablet: 2 tab(s) orally every 6 hours, As needed, Temp greater or equal to 38C (100.4F), Mild Pain (1 - 3)  Advil 200 mg oral tablet: 1 tab(s) orally , As Needed  melatonin 1 mg oral tablet: 1 tab(s) orally once a day (at bedtime) acetaminophen 325 mg oral tablet: 2 tab(s) orally every 6 hours, As needed, Temp greater or equal to 38C (100.4F), Mild Pain (1 - 3)  azithromycin 500 mg oral tablet: 1 tab(s) orally once a day  melatonin 1 mg oral tablet: 1 tab(s) orally once a day (at bedtime)

## 2020-08-02 NOTE — DISCHARGE NOTE NURSING/CASE MANAGEMENT/SOCIAL WORK - PATIENT PORTAL LINK FT
You can access the FollowMyHealth Patient Portal offered by [facility] by registering at the following website: [email or web address]. By joining [facility]’s FollowMyHealth portal, you will also be able to view your health information using other applications (apps) compatible with our system.

## 2020-08-02 NOTE — DISCHARGE NOTE PROVIDER - HOSPITAL COURSE
to be done Aden Oneil is a 18 yo male w/no pmh who presents to [facility] for fever, chills, abd pain, diarrhea since Thursday. He had a CT abdomen/pelvis which showed a right sided colitis. He was admitted for sepsis 2/2 infectious diarrhea. Infectious disease was consulted, stool studies were sent. Blood cultures negative. Stool culture and stool PCR + for Campylobacter. His diarrhea improved greatly, sepsis resolved, and patient was stable for discharge. He will be discharged home with a course of azithromycin and plans to follow up with a gastroenterologist.         Discharge diagnoses:    Sepsis    Inflammatory diarrhea    Campylobacter in stool    Leukocytosis    Right sided colitis

## 2020-08-02 NOTE — PROGRESS NOTE ADULT - PROBLEM SELECTOR PLAN 2
- 2/2 colitis, COVID neg x2  - IVF  - f/u blood cx
- 2/2 colitis, COVID neg   - IVF  - f/u blood cx
- 2/2 colitis, COVID neg x2  - DC IVF, patient is eating  - f/u blood cx - no growth  - sepsis has resolved

## 2020-08-05 LAB
CULTURE RESULTS: SIGNIFICANT CHANGE UP
SPECIMEN SOURCE: SIGNIFICANT CHANGE UP

## 2021-05-18 ENCOUNTER — APPOINTMENT (OUTPATIENT)
Dept: CARDIOLOGY | Facility: CLINIC | Age: 21
End: 2021-05-18
Payer: COMMERCIAL

## 2021-05-18 ENCOUNTER — NON-APPOINTMENT (OUTPATIENT)
Age: 21
End: 2021-05-18

## 2021-05-18 VITALS
TEMPERATURE: 97.9 F | DIASTOLIC BLOOD PRESSURE: 83 MMHG | WEIGHT: 191 LBS | OXYGEN SATURATION: 96 % | SYSTOLIC BLOOD PRESSURE: 136 MMHG | HEART RATE: 60 BPM

## 2021-05-18 VITALS — SYSTOLIC BLOOD PRESSURE: 120 MMHG | DIASTOLIC BLOOD PRESSURE: 75 MMHG

## 2021-05-18 DIAGNOSIS — U07.1 COVID-19: ICD-10-CM

## 2021-05-18 PROCEDURE — 99203 OFFICE O/P NEW LOW 30 MIN: CPT

## 2021-05-18 PROCEDURE — 99072 ADDL SUPL MATRL&STAF TM PHE: CPT

## 2021-05-18 PROCEDURE — 93000 ELECTROCARDIOGRAM COMPLETE: CPT

## 2021-05-18 RX ORDER — TRIAMCINOLONE ACETONIDE 1 MG/G
0.1 OINTMENT TOPICAL
Qty: 1 | Refills: 0 | Status: DISCONTINUED | COMMUNITY
Start: 2017-07-07 | End: 2021-05-18

## 2021-05-18 RX ORDER — IBUPROFEN 800 MG/1
TABLET, FILM COATED ORAL
Refills: 0 | Status: DISCONTINUED | COMMUNITY
End: 2021-05-18

## 2021-05-18 RX ORDER — VALACYCLOVIR HYDROCHLORIDE 1 G/1
1 TABLET, FILM COATED ORAL
Qty: 1 | Refills: 0 | Status: DISCONTINUED | COMMUNITY
Start: 2017-07-07 | End: 2021-05-18

## 2021-05-18 RX ORDER — MUPIROCIN 20 MG/G
2 OINTMENT TOPICAL
Qty: 2 | Refills: 0 | Status: DISCONTINUED | COMMUNITY
Start: 2017-07-07 | End: 2021-05-18

## 2021-05-18 NOTE — HISTORY OF PRESENT ILLNESS
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  He is in good general health with no significant past medical history.  He tested positive for coronavirus on 3/6.  He had mild symptoms which consisted of loss of smell and weakness for few days.  He is feeling fine at present and has resumed full duties.

## 2021-05-18 NOTE — DISCUSSION/SUMMARY
[FreeTextEntry1] : Mr Oneil is a 20-year-old male who contracted coronavirus on 3/6.  He is asymptomatic.  His exam shows regular rhythm, normal blood pressure, clear lungs, and a normal cardiac exam.  His EKG is within normal limits.\par \par Is no evidence of cardiac coronavirus involvement or residual infection.  He is cleared for all activity.

## 2023-10-23 RX ORDER — IBUPROFEN 200 MG
1 TABLET ORAL
Qty: 0 | Refills: 0 | DISCHARGE
